# Patient Record
Sex: MALE | Race: WHITE | NOT HISPANIC OR LATINO | URBAN - METROPOLITAN AREA
[De-identification: names, ages, dates, MRNs, and addresses within clinical notes are randomized per-mention and may not be internally consistent; named-entity substitution may affect disease eponyms.]

---

## 2017-02-03 ENCOUNTER — ALLSCRIPTS OFFICE VISIT (OUTPATIENT)
Dept: OTHER | Facility: OTHER | Age: 72
End: 2017-02-03

## 2017-02-03 DIAGNOSIS — R26.89 OTHER ABNORMALITIES OF GAIT AND MOBILITY: ICD-10-CM

## 2017-02-03 DIAGNOSIS — M79.10 MYALGIA: ICD-10-CM

## 2017-02-03 DIAGNOSIS — M25.50 PAIN IN JOINT: ICD-10-CM

## 2017-02-03 DIAGNOSIS — M54.50 LOW BACK PAIN: ICD-10-CM

## 2017-02-09 ENCOUNTER — APPOINTMENT (OUTPATIENT)
Dept: PHYSICAL THERAPY | Facility: CLINIC | Age: 72
End: 2017-02-09
Payer: MEDICARE

## 2017-02-09 ENCOUNTER — GENERIC CONVERSION - ENCOUNTER (OUTPATIENT)
Dept: OTHER | Facility: OTHER | Age: 72
End: 2017-02-09

## 2017-03-06 ENCOUNTER — APPOINTMENT (OUTPATIENT)
Dept: PHYSICAL THERAPY | Facility: CLINIC | Age: 72
End: 2017-03-06
Payer: MEDICARE

## 2017-03-06 DIAGNOSIS — M54.50 LOW BACK PAIN: ICD-10-CM

## 2017-03-06 DIAGNOSIS — R26.89 OTHER ABNORMALITIES OF GAIT AND MOBILITY: ICD-10-CM

## 2017-03-06 PROCEDURE — 97163 PT EVAL HIGH COMPLEX 45 MIN: CPT

## 2017-03-06 PROCEDURE — G8990 OTHER PT/OT CURRENT STATUS: HCPCS

## 2017-03-06 PROCEDURE — G8991 OTHER PT/OT GOAL STATUS: HCPCS

## 2017-03-17 ENCOUNTER — APPOINTMENT (OUTPATIENT)
Dept: PHYSICAL THERAPY | Facility: CLINIC | Age: 72
End: 2017-03-17
Payer: MEDICARE

## 2017-03-24 ENCOUNTER — APPOINTMENT (OUTPATIENT)
Dept: PHYSICAL THERAPY | Facility: CLINIC | Age: 72
End: 2017-03-24
Payer: MEDICARE

## 2017-03-24 PROCEDURE — 97110 THERAPEUTIC EXERCISES: CPT

## 2017-03-24 PROCEDURE — 97140 MANUAL THERAPY 1/> REGIONS: CPT

## 2017-03-31 ENCOUNTER — APPOINTMENT (OUTPATIENT)
Dept: PHYSICAL THERAPY | Facility: CLINIC | Age: 72
End: 2017-03-31
Payer: MEDICARE

## 2017-03-31 PROCEDURE — 97530 THERAPEUTIC ACTIVITIES: CPT

## 2017-03-31 PROCEDURE — 97110 THERAPEUTIC EXERCISES: CPT

## 2017-04-07 ENCOUNTER — APPOINTMENT (OUTPATIENT)
Dept: PHYSICAL THERAPY | Facility: CLINIC | Age: 72
End: 2017-04-07
Payer: MEDICARE

## 2017-04-07 PROCEDURE — 97110 THERAPEUTIC EXERCISES: CPT

## 2017-04-07 PROCEDURE — 97530 THERAPEUTIC ACTIVITIES: CPT

## 2017-04-21 ENCOUNTER — APPOINTMENT (OUTPATIENT)
Dept: PHYSICAL THERAPY | Facility: CLINIC | Age: 72
End: 2017-04-21
Payer: MEDICARE

## 2017-04-28 ENCOUNTER — APPOINTMENT (OUTPATIENT)
Dept: PHYSICAL THERAPY | Facility: CLINIC | Age: 72
End: 2017-04-28
Payer: MEDICARE

## 2018-01-13 VITALS
RESPIRATION RATE: 18 BRPM | WEIGHT: 143.6 LBS | HEART RATE: 72 BPM | HEIGHT: 61 IN | DIASTOLIC BLOOD PRESSURE: 88 MMHG | BODY MASS INDEX: 27.11 KG/M2 | SYSTOLIC BLOOD PRESSURE: 122 MMHG

## 2018-06-11 RX ORDER — POTASSIUM CHLORIDE 750 MG/1
CAPSULE, EXTENDED RELEASE ORAL
COMMUNITY

## 2018-06-11 RX ORDER — MAGNESIUM CARB/ALUMINUM HYDROX 105-160MG
1 TABLET,CHEWABLE ORAL 2 TIMES DAILY
COMMUNITY

## 2018-06-11 RX ORDER — UBIDECARENONE 75 MG
1 CAPSULE ORAL 2 TIMES DAILY
COMMUNITY

## 2018-06-11 RX ORDER — MULTIVITAMIN WITH IRON
1 TABLET ORAL 2 TIMES DAILY
COMMUNITY

## 2018-06-11 RX ORDER — BIOTIN 1 MG
TABLET ORAL DAILY
COMMUNITY
Start: 2014-10-08

## 2018-06-12 ENCOUNTER — OFFICE VISIT (OUTPATIENT)
Dept: FAMILY MEDICINE CLINIC | Facility: CLINIC | Age: 73
End: 2018-06-12
Payer: MEDICARE

## 2018-06-12 VITALS
BODY MASS INDEX: 26.94 KG/M2 | WEIGHT: 142.6 LBS | RESPIRATION RATE: 14 BRPM | HEART RATE: 64 BPM | TEMPERATURE: 98 F | DIASTOLIC BLOOD PRESSURE: 72 MMHG | SYSTOLIC BLOOD PRESSURE: 118 MMHG

## 2018-06-12 DIAGNOSIS — N52.9 ERECTILE DYSFUNCTION, UNSPECIFIED ERECTILE DYSFUNCTION TYPE: ICD-10-CM

## 2018-06-12 DIAGNOSIS — H61.22 IMPACTED CERUMEN OF LEFT EAR: Primary | ICD-10-CM

## 2018-06-12 DIAGNOSIS — H60.502 ACUTE OTITIS EXTERNA OF LEFT EAR, UNSPECIFIED TYPE: ICD-10-CM

## 2018-06-12 PROCEDURE — 99213 OFFICE O/P EST LOW 20 MIN: CPT | Performed by: FAMILY MEDICINE

## 2018-06-12 RX ORDER — NEOMYCIN SULFATE, POLYMYXIN B SULFATE AND HYDROCORTISONE 10; 3.5; 1 MG/ML; MG/ML; [USP'U]/ML
4 SUSPENSION/ DROPS AURICULAR (OTIC) 3 TIMES DAILY
Qty: 10 ML | Refills: 0 | Status: SHIPPED | OUTPATIENT
Start: 2018-06-12

## 2018-06-12 RX ORDER — SILDENAFIL 100 MG/1
50 TABLET, FILM COATED ORAL DAILY PRN
Qty: 10 TABLET | Refills: 6 | Status: SHIPPED | OUTPATIENT
Start: 2018-06-12

## 2018-06-13 NOTE — PROGRESS NOTES
Assessment/Plan:  1  Impacted cerumen of left ear  - attempted to irrigate with warm water several times unsuccessfully, start Debrox ear drops and return in 1 week for repeat irrigation  - carbamide peroxide (DEBROX) 6 5 % otic solution; Administer 5 drops into the left ear 2 (two) times a day  Dispense: 15 mL; Refill: 0    2  Acute otitis externa of left ear, unspecified type  - neomycin-polymyxin-hydrocortisone (CORTISPORIN) 0 35%-10,000 units/mL-1% otic suspension; Administer 4 drops into the left ear 3 (three) times a day  Dispense: 10 mL; Refill: 0    3  Erectile dysfunction, unspecified erectile dysfunction type  - sildenafil (VIAGRA) 100 mg tablet; Take 0 5 tablets (50 mg total) by mouth daily as needed for erectile dysfunction  Dispense: 10 tablet; Refill: 6       Possible side effects of new medications were reviewed with the patient today  The treatment plan was reviewed with the patient  The patient understands and agrees with the treatment plan      Subjective:   Chief Complaint   Patient presents with    Earache     L      Patient ID: Brett Schneider is a 71 y o  male who presents today with c/o left ear pain and itching for the past few days, he noticed bloody d/c on his pillow yesterday am, no nasal congestion, no fever or chills, no cough  Patient has no other complaints, he is requesting refill for his Viagra that he has been using with good results            Past Medical History:   Diagnosis Date    Anemia     [id number]  RESOLVED    Gastric ulcer     Polio      Past Surgical History:   Procedure Laterality Date    FEMUR FRACTURE SURGERY Right     FOOT SURGERY      X2    LEG SURGERY      SHOULDER SURGERY Left      Family History   Problem Relation Age of Onset    Diabetes Mother     Thyroid disease Mother     Emphysema Father     Coronary artery disease Maternal Grandfather     Mental illness Neg Hx     Substance Abuse Neg Hx      Social History     Social History    Marital status: /Civil Union     Spouse name: N/A    Number of children: N/A    Years of education: N/A     Occupational History    Not on file  Social History Main Topics    Smoking status: Current Every Day Smoker     Packs/day: 1 00    Smokeless tobacco: Never Used    Alcohol use Yes      Comment: Rare use     Drug use: No    Sexual activity: Not on file     Other Topics Concern    Not on file     Social History Narrative    ADVANCE DIRECTIVE ON FILE    ALWAYS USES SEAT BELT    CAFFEINE USE   3-4 CUPS COFFEE A DAY ICED TEA DURING DAY       Current Outpatient Prescriptions:     Cholecalciferol (VITAMIN D3) 1000 units CAPS, Take by mouth daily, Disp: , Rfl:     cyanocobalamin (VITAMIN B-12) 100 mcg tablet, Take 1 tablet by mouth 2 (two) times a day, Disp: , Rfl:     Glucosamine-Chondroitin 750-600 MG TABS, Take 1 tablet by mouth 2 (two) times a day, Disp: , Rfl:     Omega-3 Fatty Acids (FISH OIL) 645 MG CAPS, Take 1 capsule by mouth 2 (two) times a day, Disp: , Rfl:     potassium chloride (MICRO-K) 10 MEQ CR capsule, Take by mouth, Disp: , Rfl:     pyridoxine (VITAMIN B6) 100 mg tablet, Take 1 tablet by mouth 2 (two) times a day, Disp: , Rfl:     carbamide peroxide (DEBROX) 6 5 % otic solution, Administer 5 drops into the left ear 2 (two) times a day, Disp: 15 mL, Rfl: 0    neomycin-polymyxin-hydrocortisone (CORTISPORIN) 0 35%-10,000 units/mL-1% otic suspension, Administer 4 drops into the left ear 3 (three) times a day, Disp: 10 mL, Rfl: 0    sildenafil (VIAGRA) 100 mg tablet, Take 0 5 tablets (50 mg total) by mouth daily as needed for erectile dysfunction, Disp: 10 tablet, Rfl: 6    Review of Systems   Constitutional: Negative for chills, fatigue, fever and unexpected weight change  HENT: Negative for congestion, postnasal drip, sinus pain and trouble swallowing  Respiratory: Negative for shortness of breath and wheezing      Cardiovascular: Negative for chest pain, palpitations and leg swelling  Genitourinary: Negative for difficulty urinating, dysuria, frequency, hematuria and urgency  Neurological: Negative for dizziness and syncope  Objective:    Vitals:    06/12/18 1430 06/12/18 1514   BP: 110/62 118/72   BP Location: Right arm Left arm   Patient Position: Sitting Sitting   Cuff Size: Standard Standard   Pulse: 64    Resp: 14    Temp: 98 °F (36 7 °C)    Weight: 64 7 kg (142 lb 9 6 oz)         Physical Exam   Constitutional: He appears well-developed and well-nourished  No distress  HENT:   Right Ear: Tympanic membrane and ear canal normal    Nose: No mucosal edema or rhinorrhea  Mouth/Throat: Oropharynx is clear and moist    Left ear canal erythematous with cerumen impaction, + tragal tenderness   Cardiovascular: Normal rate, regular rhythm and normal heart sounds  No murmur heard  Pulmonary/Chest: Effort normal and breath sounds normal  No respiratory distress  He has no wheezes  He has no rales

## 2018-06-19 ENCOUNTER — OFFICE VISIT (OUTPATIENT)
Dept: FAMILY MEDICINE CLINIC | Facility: CLINIC | Age: 73
End: 2018-06-19
Payer: MEDICARE

## 2018-06-19 VITALS
BODY MASS INDEX: 26.98 KG/M2 | DIASTOLIC BLOOD PRESSURE: 82 MMHG | WEIGHT: 142.8 LBS | RESPIRATION RATE: 14 BRPM | HEART RATE: 72 BPM | TEMPERATURE: 97.6 F | SYSTOLIC BLOOD PRESSURE: 120 MMHG

## 2018-06-19 DIAGNOSIS — R53.83 FATIGUE, UNSPECIFIED TYPE: ICD-10-CM

## 2018-06-19 DIAGNOSIS — H60.502 ACUTE OTITIS EXTERNA OF LEFT EAR, UNSPECIFIED TYPE: ICD-10-CM

## 2018-06-19 DIAGNOSIS — H93.12 TINNITUS OF LEFT EAR: ICD-10-CM

## 2018-06-19 DIAGNOSIS — E78.2 MIXED HYPERLIPIDEMIA: ICD-10-CM

## 2018-06-19 DIAGNOSIS — R49.9 CHANGE IN VOICE: ICD-10-CM

## 2018-06-19 DIAGNOSIS — Z12.5 SCREENING FOR PROSTATE CANCER: ICD-10-CM

## 2018-06-19 DIAGNOSIS — H61.22 IMPACTED CERUMEN OF LEFT EAR: Primary | ICD-10-CM

## 2018-06-19 PROCEDURE — 69210 REMOVE IMPACTED EAR WAX UNI: CPT | Performed by: FAMILY MEDICINE

## 2018-06-19 PROCEDURE — 99213 OFFICE O/P EST LOW 20 MIN: CPT | Performed by: FAMILY MEDICINE

## 2018-06-21 NOTE — PROGRESS NOTES
Assessment/Plan:  1  Impacted cerumen of left ear  - cerumen removed without difficulty with Pt reports improvement of his hearing    2  Acute otitis externa of left ear, unspecified type  - resolved    3  HM/ Mixed hyperlipidemia  - Lipid Panel with Direct LDL reflex; Future  - Comprehensive metabolic panel; Future  - CBC and differential; Future  - TSH, 3rd generation with Free T4 reflex; Future  - Urinalysis with reflex to microscopic    4  Screening for prostate cancer  - PSA, Total Screen; Future    5  Change in voice/ Tinnitus of left ear  - Ambulatory Referral to Otolaryngology; Future    F/u in 1 month for AWV with labs done prior  The treatment plan was reviewed with the patient  The patient understands and agrees with the treatment plan      Subjective:   Chief Complaint   Patient presents with    Earache     1 week recheck       Patient ID: Genetta Bumpers is a 71 y o  male who is here today for f/u her his left ear pain and cerumen impaction  He has been using his ear drops and no longer having ear pain, but still having plugged ear sensation  He is also c/o intermittent ringing in his left ear for years which has been getting progressively worse  He is also noticing a voice change that comes a goes for the oast several months, no sore throat, no trouble swallowing, no heartburn or abdominal pain             Past Medical History:   Diagnosis Date    Anemia     [id number]  RESOLVED    Gastric ulcer     Polio      Past Surgical History:   Procedure Laterality Date    FEMUR FRACTURE SURGERY Right     FOOT SURGERY      X2    LEG SURGERY      SHOULDER SURGERY Left      Family History   Problem Relation Age of Onset    Diabetes Mother     Thyroid disease Mother     Emphysema Father     Coronary artery disease Maternal Grandfather     Mental illness Neg Hx     Substance Abuse Neg Hx      Social History     Social History    Marital status: /Civil Union     Spouse name: N/A    Number of children: N/A    Years of education: N/A     Occupational History    Not on file  Social History Main Topics    Smoking status: Current Every Day Smoker     Packs/day: 1 00    Smokeless tobacco: Never Used    Alcohol use Yes      Comment: Rare use     Drug use: No    Sexual activity: Not on file     Other Topics Concern    Not on file     Social History Narrative    ADVANCE DIRECTIVE ON FILE    ALWAYS USES SEAT BELT    CAFFEINE USE   3-4 CUPS COFFEE A DAY ICED TEA DURING DAY       Current Outpatient Prescriptions:     carbamide peroxide (DEBROX) 6 5 % otic solution, Administer 5 drops into the left ear 2 (two) times a day, Disp: 15 mL, Rfl: 0    Cholecalciferol (VITAMIN D3) 1000 units CAPS, Take by mouth daily, Disp: , Rfl:     cyanocobalamin (VITAMIN B-12) 100 mcg tablet, Take 1 tablet by mouth 2 (two) times a day, Disp: , Rfl:     Glucosamine-Chondroitin 750-600 MG TABS, Take 1 tablet by mouth 2 (two) times a day, Disp: , Rfl:     neomycin-polymyxin-hydrocortisone (CORTISPORIN) 0 35%-10,000 units/mL-1% otic suspension, Administer 4 drops into the left ear 3 (three) times a day, Disp: 10 mL, Rfl: 0    Omega-3 Fatty Acids (FISH OIL) 645 MG CAPS, Take 1 capsule by mouth 2 (two) times a day, Disp: , Rfl:     potassium chloride (MICRO-K) 10 MEQ CR capsule, Take by mouth, Disp: , Rfl:     pyridoxine (VITAMIN B6) 100 mg tablet, Take 1 tablet by mouth 2 (two) times a day, Disp: , Rfl:     sildenafil (VIAGRA) 100 mg tablet, Take 0 5 tablets (50 mg total) by mouth daily as needed for erectile dysfunction, Disp: 10 tablet, Rfl: 6    Review of Systems   Constitutional: Negative for chills, fatigue, fever and unexpected weight change  HENT: Positive for hearing loss, tinnitus and voice change  Negative for congestion, ear discharge, ear pain, postnasal drip, sinus pain, sore throat and trouble swallowing  Respiratory: Negative for shortness of breath and wheezing      Cardiovascular: Negative for chest pain, palpitations and leg swelling  Gastrointestinal: Negative for abdominal pain, blood in stool, diarrhea, nausea and vomiting  Genitourinary: Negative for difficulty urinating, dysuria, frequency, hematuria and urgency  Neurological: Negative for dizziness and syncope  Objective:    Vitals:    06/19/18 1451   BP: 120/82   BP Location: Left arm   Patient Position: Sitting   Cuff Size: Standard   Pulse: 72   Resp: 14   Temp: 97 6 °F (36 4 °C)   TempSrc: Oral   Weight: 64 8 kg (142 lb 12 8 oz)        Physical Exam   Constitutional: He appears well-developed and well-nourished  No distress  HENT:   Right Ear: Tympanic membrane and ear canal normal    Nose: No mucosal edema or rhinorrhea  Mouth/Throat: Oropharynx is clear and moist    Left ear canal with cerumen impaction  Post irrigation: TM and EAC clear, no tragal tenderness   Neck: Neck supple  No thyromegaly present  Cardiovascular: Normal rate, regular rhythm and normal heart sounds  No murmur heard  Pulmonary/Chest: Effort normal and breath sounds normal  No respiratory distress  He has no wheezes  He has no rales  Lymphadenopathy:     He has no cervical adenopathy  Ear cerumen removal  Date/Time: 6/19/2018 1:44 PM  Performed by: Loretta Landeros  Authorized by: Loretta Landeros     Consent:     Consent obtained:  Verbal    Risks discussed:  Bleeding, infection, dizziness, TM perforation and pain    Alternatives discussed:  Referral  Procedure details:     Location:  L ear    Procedure type: curette      Procedure type comment:  And irrigation  Post-procedure details:     Hearing quality:  Improved    Patient tolerance of procedure:   Tolerated well, no immediate complications

## 2018-10-17 ENCOUNTER — IMMUNIZATION (OUTPATIENT)
Dept: FAMILY MEDICINE CLINIC | Facility: CLINIC | Age: 73
End: 2018-10-17
Payer: MEDICARE

## 2018-10-17 DIAGNOSIS — Z23 ENCOUNTER FOR IMMUNIZATION: ICD-10-CM

## 2018-10-17 PROCEDURE — 90686 IIV4 VACC NO PRSV 0.5 ML IM: CPT

## 2018-10-17 PROCEDURE — G0008 ADMIN INFLUENZA VIRUS VAC: HCPCS

## 2023-03-27 ENCOUNTER — OFFICE VISIT (OUTPATIENT)
Dept: FAMILY MEDICINE CLINIC | Facility: CLINIC | Age: 78
End: 2023-03-27

## 2023-03-27 VITALS
WEIGHT: 141.5 LBS | RESPIRATION RATE: 16 BRPM | HEIGHT: 64 IN | DIASTOLIC BLOOD PRESSURE: 78 MMHG | HEART RATE: 85 BPM | BODY MASS INDEX: 24.16 KG/M2 | OXYGEN SATURATION: 96 % | SYSTOLIC BLOOD PRESSURE: 130 MMHG

## 2023-03-27 DIAGNOSIS — E55.9 VITAMIN D DEFICIENCY: ICD-10-CM

## 2023-03-27 DIAGNOSIS — I77.89 OTHER SPECIFIED DISORDERS OF ARTERIES AND ARTERIOLES (HCC): ICD-10-CM

## 2023-03-27 DIAGNOSIS — Z76.89 ENCOUNTER TO ESTABLISH CARE: Primary | ICD-10-CM

## 2023-03-27 DIAGNOSIS — R31.29 MICROSCOPIC HEMATURIA: ICD-10-CM

## 2023-03-27 DIAGNOSIS — Z13.29 SCREENING FOR THYROID DISORDER: ICD-10-CM

## 2023-03-27 DIAGNOSIS — Z87.891 FORMER SMOKER: ICD-10-CM

## 2023-03-27 DIAGNOSIS — Z13.6 SCREENING FOR CARDIOVASCULAR CONDITION: ICD-10-CM

## 2023-03-27 DIAGNOSIS — I65.29 STENOSIS OF CAROTID ARTERY, UNSPECIFIED LATERALITY: ICD-10-CM

## 2023-03-27 DIAGNOSIS — Z12.11 COLON CANCER SCREENING: ICD-10-CM

## 2023-03-27 DIAGNOSIS — Z23 NEED FOR PNEUMOCOCCAL VACCINATION: ICD-10-CM

## 2023-03-27 DIAGNOSIS — Z13.228 SCREENING FOR METABOLIC DISORDER: ICD-10-CM

## 2023-03-27 DIAGNOSIS — E78.2 MIXED HYPERLIPIDEMIA: ICD-10-CM

## 2023-03-27 DIAGNOSIS — Z13.89 SCREENING FOR BLOOD OR PROTEIN IN URINE: ICD-10-CM

## 2023-03-27 DIAGNOSIS — Z12.5 SCREENING FOR PROSTATE CANCER: ICD-10-CM

## 2023-03-27 PROBLEM — A80.9 POLIO: Status: ACTIVE | Noted: 2018-08-07

## 2023-03-27 PROBLEM — M41.9 SCOLIOSIS: Status: ACTIVE | Noted: 2018-08-07

## 2023-03-27 NOTE — PROGRESS NOTES
"Assessment/Plan:     Diagnoses and all orders for this visit:    Encounter to establish care    New/old patient here today to reestablish care  Mixed hyperlipidemia  -     Lipid panel; Future    Screening for cardiovascular condition  -     CBC and differential; Future    Screening for metabolic disorder  -     Comprehensive metabolic panel; Future    Stenosis of carotid artery, unspecified laterality  -     VAS carotid complete study; Future    Hx of stenosis in patient's chart  Pt is unsure how long ago this was diagnosed  Carotid u/s ordered  Screening for thyroid disorder  -     TSH, 3rd generation with Free T4 reflex; Future    Screening for prostate cancer  -     PSA, Total Screen; Future    Vitamin D deficiency  -     Vitamin D 25 hydroxy; Future    Screening for blood or protein in urine  -     UA (URINE) with reflex to Scope    Microscopic hematuria  -     CBC and differential; Future    Other specified disorders of arteries and arterioles (HCC)  -     VAS carotid complete study; Future    Colon cancer screening  -     Cologuard    Need for pneumococcal vaccination  -     Pneumococcal Conjugate Vaccine 20-valent (Pcv20)    Former smoker  -     CT lung screening program; Future            Pt to have fasting labs and schedule appt  For AWV  Subjective:      Patient ID: Tracy Honeycutt is a 69 y o  male  Pt here to re-establish care today  Pt admits that he has not been taking care of himself like he should  He notes his wife passed away last year  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 scoliosis  The following portions of the patient's history were reviewed and updated as appropriate: allergies, current medications, past family history, past medical history, past social history, past surgical history and problem list     Review of Systems    As noted per HPI      Objective:      /78   Pulse 85   Resp 16   Ht 5' 4\" (1 626 m)   Wt 64 2 kg (141 lb 8 oz)   SpO2 96%   BMI " 24 29 kg/m²          Physical Exam  Vitals reviewed  Constitutional:       Appearance: Normal appearance  Cardiovascular:      Rate and Rhythm: Normal rate and regular rhythm  Pulses: Normal pulses  Heart sounds: Normal heart sounds  Pulmonary:      Effort: Pulmonary effort is normal       Breath sounds: Normal breath sounds  Abdominal:      General: Bowel sounds are normal       Palpations: Abdomen is soft  Musculoskeletal:      Right lower leg: Edema (trace) present  Left lower leg: Edema (trace) present  Skin:     General: Skin is warm  Capillary Refill: Capillary refill takes less than 2 seconds  Neurological:      General: No focal deficit present  Mental Status: He is alert and oriented to person, place, and time     Psychiatric:         Mood and Affect: Mood normal          Behavior: Behavior normal

## 2023-04-20 ENCOUNTER — APPOINTMENT (OUTPATIENT)
Dept: LAB | Facility: CLINIC | Age: 78
End: 2023-04-20

## 2023-04-20 DIAGNOSIS — E78.2 MIXED HYPERLIPIDEMIA: ICD-10-CM

## 2023-04-20 DIAGNOSIS — E55.9 VITAMIN D DEFICIENCY: ICD-10-CM

## 2023-04-20 DIAGNOSIS — R31.29 MICROSCOPIC HEMATURIA: ICD-10-CM

## 2023-04-20 DIAGNOSIS — Z12.5 SCREENING FOR PROSTATE CANCER: ICD-10-CM

## 2023-04-20 DIAGNOSIS — Z13.6 SCREENING FOR CARDIOVASCULAR CONDITION: ICD-10-CM

## 2023-04-20 DIAGNOSIS — Z13.29 SCREENING FOR THYROID DISORDER: ICD-10-CM

## 2023-04-20 DIAGNOSIS — Z13.228 SCREENING FOR METABOLIC DISORDER: ICD-10-CM

## 2023-04-20 LAB
25(OH)D3 SERPL-MCNC: 31.1 NG/ML (ref 30–100)
ALBUMIN SERPL BCP-MCNC: 3.7 G/DL (ref 3.5–5)
ALP SERPL-CCNC: 96 U/L (ref 46–116)
ALT SERPL W P-5'-P-CCNC: 19 U/L (ref 12–78)
ANION GAP SERPL CALCULATED.3IONS-SCNC: 1 MMOL/L (ref 4–13)
AST SERPL W P-5'-P-CCNC: 20 U/L (ref 5–45)
BASOPHILS # BLD AUTO: 0.07 THOUSANDS/ΜL (ref 0–0.1)
BASOPHILS NFR BLD AUTO: 1 % (ref 0–1)
BILIRUB SERPL-MCNC: 0.68 MG/DL (ref 0.2–1)
BUN SERPL-MCNC: 17 MG/DL (ref 5–25)
CALCIUM SERPL-MCNC: 9.3 MG/DL (ref 8.3–10.1)
CHLORIDE SERPL-SCNC: 110 MMOL/L (ref 96–108)
CHOLEST SERPL-MCNC: 170 MG/DL
CO2 SERPL-SCNC: 29 MMOL/L (ref 21–32)
CREAT SERPL-MCNC: 0.39 MG/DL (ref 0.6–1.3)
EOSINOPHIL # BLD AUTO: 0.19 THOUSAND/ΜL (ref 0–0.61)
EOSINOPHIL NFR BLD AUTO: 3 % (ref 0–6)
ERYTHROCYTE [DISTWIDTH] IN BLOOD BY AUTOMATED COUNT: 14.4 % (ref 11.6–15.1)
GFR SERPL CREATININE-BSD FRML MDRD: 116 ML/MIN/1.73SQ M
GLUCOSE P FAST SERPL-MCNC: 90 MG/DL (ref 65–99)
HCT VFR BLD AUTO: 46.1 % (ref 36.5–49.3)
HDLC SERPL-MCNC: 75 MG/DL
HGB BLD-MCNC: 14.7 G/DL (ref 12–17)
IMM GRANULOCYTES # BLD AUTO: 0.02 THOUSAND/UL (ref 0–0.2)
IMM GRANULOCYTES NFR BLD AUTO: 0 % (ref 0–2)
LDLC SERPL CALC-MCNC: 80 MG/DL (ref 0–100)
LYMPHOCYTES # BLD AUTO: 1.4 THOUSANDS/ΜL (ref 0.6–4.47)
LYMPHOCYTES NFR BLD AUTO: 24 % (ref 14–44)
MCH RBC QN AUTO: 32.4 PG (ref 26.8–34.3)
MCHC RBC AUTO-ENTMCNC: 31.9 G/DL (ref 31.4–37.4)
MCV RBC AUTO: 102 FL (ref 82–98)
MONOCYTES # BLD AUTO: 0.56 THOUSAND/ΜL (ref 0.17–1.22)
MONOCYTES NFR BLD AUTO: 10 % (ref 4–12)
NEUTROPHILS # BLD AUTO: 3.54 THOUSANDS/ΜL (ref 1.85–7.62)
NEUTS SEG NFR BLD AUTO: 62 % (ref 43–75)
NONHDLC SERPL-MCNC: 95 MG/DL
NRBC BLD AUTO-RTO: 0 /100 WBCS
PLATELET # BLD AUTO: 280 THOUSANDS/UL (ref 149–390)
PMV BLD AUTO: 10.9 FL (ref 8.9–12.7)
POTASSIUM SERPL-SCNC: 5 MMOL/L (ref 3.5–5.3)
PROT SERPL-MCNC: 7.2 G/DL (ref 6.4–8.4)
PSA SERPL-MCNC: 0.4 NG/ML (ref 0–4)
RBC # BLD AUTO: 4.54 MILLION/UL (ref 3.88–5.62)
SODIUM SERPL-SCNC: 140 MMOL/L (ref 135–147)
TRIGL SERPL-MCNC: 74 MG/DL
TSH SERPL DL<=0.05 MIU/L-ACNC: 1.72 UIU/ML (ref 0.45–4.5)
WBC # BLD AUTO: 5.78 THOUSAND/UL (ref 4.31–10.16)

## 2023-04-27 ENCOUNTER — HOSPITAL ENCOUNTER (OUTPATIENT)
Dept: RADIOLOGY | Facility: HOSPITAL | Age: 78
Discharge: HOME | End: 2023-04-27

## 2023-04-27 DIAGNOSIS — Z87.891 FORMER SMOKER: ICD-10-CM

## 2023-05-01 ENCOUNTER — HOSPITAL ENCOUNTER (OUTPATIENT)
Dept: NON INVASIVE DIAGNOSTICS | Facility: CLINIC | Age: 78
Discharge: HOME | End: 2023-05-01

## 2023-05-01 ENCOUNTER — TELEPHONE (OUTPATIENT)
Dept: FAMILY MEDICINE CLINIC | Facility: CLINIC | Age: 78
End: 2023-05-01

## 2023-05-01 DIAGNOSIS — I77.89 OTHER SPECIFIED DISORDERS OF ARTERIES AND ARTERIOLES (HCC): ICD-10-CM

## 2023-05-01 DIAGNOSIS — Z87.891 FORMER SMOKER: Primary | ICD-10-CM

## 2023-05-01 DIAGNOSIS — I65.29 STENOSIS OF CAROTID ARTERY, UNSPECIFIED LATERALITY: ICD-10-CM

## 2023-05-01 NOTE — TELEPHONE ENCOUNTER
----- Message from Thor Cid sent at 5/1/2023  1:43 PM EDT -----  Please let patient know that his lung CT screening did not show any lung nodules  It did note some gallstones, although no infection or obstructing stones  The radiologist recommends we repeat Lung CT scan in 1 year which I have ordered

## 2023-05-01 NOTE — TELEPHONE ENCOUNTER
I called and s/w the pt he is aware of his results  Pt has VAS CEREBROVASCULAR STUDY done today  His colonoscopy is schedule for July 28th (that was the soonest)  Pt is wondering if that is ok to wait until July

## 2023-05-02 ENCOUNTER — TELEPHONE (OUTPATIENT)
Dept: FAMILY MEDICINE CLINIC | Facility: CLINIC | Age: 78
End: 2023-05-02

## 2023-05-02 NOTE — TELEPHONE ENCOUNTER
I called and s/w the pt, he is aware of the results  I called the GI office and they said to have him call their office and they will go over a few questions to see if he qualifies to just be scheduled for the colonoscopy  His 7/28 appt is just for a consult

## 2023-05-02 NOTE — TELEPHONE ENCOUNTER
----- Message from Emily Ho, [address] sent at 5/2/2023  7:39 AM EDT -----  Jeffery Coyle please call patient and let him know that there are no significant changes to his carotid arteries since his last study  No blockages noted  I am not sure why he cannot be seen until late July  Can you call GI and see if they can get him in sooner  His cologuard testing was positive  I thought that they expedited these patients?

## 2023-05-03 ENCOUNTER — OFFICE VISIT (OUTPATIENT)
Dept: GASTROENTEROLOGY | Facility: CLINIC | Age: 78
End: 2023-05-03

## 2023-05-03 VITALS
WEIGHT: 142.2 LBS | DIASTOLIC BLOOD PRESSURE: 78 MMHG | BODY MASS INDEX: 24.28 KG/M2 | TEMPERATURE: 98.4 F | HEIGHT: 64 IN | SYSTOLIC BLOOD PRESSURE: 126 MMHG

## 2023-05-03 DIAGNOSIS — Z12.11 COLON CANCER SCREENING: Primary | ICD-10-CM

## 2023-05-03 DIAGNOSIS — R19.5 POSITIVE COLORECTAL CANCER SCREENING USING COLOGUARD TEST: ICD-10-CM

## 2023-05-03 NOTE — H&P (VIEW-ONLY)
[facility] - Outpatient Consultation  Yaquelin Linder 69 y o  male MRN: [id number]  Encounter: [id number]          ASSESSMENT AND PLAN:      1  Colon cancer screening  2  Positive colorectal cancer screening using Cologuard test  I explained his positive Cologuard test could indicate presence of precancerous polyps or colon cancer  His last colonoscopy was over 20 years ago, so I recommend repeating colonoscopy at this time  I discussed risks and benefits of the procedure  Risks include but are not limited to infection, bleeding, perforation, missed lesions  He expressed understanding and all questions were answered  He request to be scheduled at the [facility]  Gave instructions for GoLytely prep  He does not take blood thinners  - Ambulatory Referral to Gastroenterology  - Colonoscopy; Future  - polyethylene glycol (GOLYTELY) 4000 mL solution; Take as directed by the office for colonoscopy  Dispense: 4000 mL; Refill: 0    Follow-up as needed  ______________________________________________________________________    HPI: 80-year-old male with history of polio and ambulatory dysfunction referred by PCP for positive Cologuard test     He had colonoscopy 20 to 25 years ago which was apparently normal  He denies any family history of colon cancer  He denies rectal bleeding, abnormal weight loss, diarrhea, constipation, nausea, vomiting, reflux, dysphagia  He lost his wife in the fall and he has not been eating as well as usual, a lot of TV dinners and not so healthy foods  He has never had abdominal surgery  He has history of smoking on and off for 50 years, but he quit about 6 months ago  REVIEW OF SYSTEMS:    CONSTITUTIONAL: Denies any fever, chills, rigors, and weight loss  HEENT: No earache or tinnitus  Denies hearing loss or visual disturbances  CARDIOVASCULAR: No chest pain or palpitations     RESPIRATORY: Denies any cough, hemoptysis, shortness of breath or "dyspnea on exertion  GASTROINTESTINAL: As noted in the History of Present Illness  GENITOURINARY: No problems with urination  Denies any hematuria or dysuria  NEUROLOGIC: No dizziness or vertigo, denies headaches  MUSCULOSKELETAL: + Hip pain and ambulatory dysfunction  SKIN: Denies skin rashes or itching  ENDOCRINE: Denies excessive thirst  Denies intolerance to heat or cold  PSYCHOSOCIAL: Denies depression or anxiety  Denies any recent memory loss  Historical Information   Past Medical History:   Diagnosis Date   • Anemia     [id number]  RESOLVED   • Gastric ulcer    • Polio    • Scoliosis      Past Surgical History:   Procedure Laterality Date   • COLONOSCOPY     • FEMUR FRACTURE SURGERY Right    • FOOT SURGERY      X2   • LEG SURGERY     • SHOULDER SURGERY Left      Social History   Social History     Substance and Sexual Activity   Alcohol Use Yes    Comment: Rare use      Social History     Substance and Sexual Activity   Drug Use No     Social History     Tobacco Use   Smoking Status Every Day   • Packs/day: 1 00   • Types: Cigarettes   Smokeless Tobacco Never     Family History   Problem Relation Age of Onset   • Diabetes Mother    • Thyroid disease Mother    • Emphysema Father    • Coronary artery disease Maternal Grandfather    • Mental illness Neg Hx    • Substance Abuse Neg Hx        Meds/Allergies       Current Outpatient Medications:   •  Cholecalciferol (VITAMIN D3) 1000 units CAPS  •  cyanocobalamin (VITAMIN B-12) 100 mcg tablet  •  Glucosamine-Chondroitin 750-600 MG TABS  •  polyethylene glycol (GOLYTELY) 4000 mL solution  •  potassium chloride (MICRO-K) 10 MEQ CR capsule  •  pyridoxine (VITAMIN B6) 100 mg tablet    No Known Allergies        Objective     Blood pressure 126/78, temperature 98 4 °F (36 9 °C), temperature source Tympanic, height 5' 4\" (1 626 m), weight 64 5 kg (142 lb 3 2 oz)  Body mass index is 24 41 kg/m²          PHYSICAL EXAM:      General Appearance:   Alert, " cooperative, no distress   HEENT:   Normocephalic, atraumatic, anicteric      Neck:  Supple, symmetrical, trachea midline   Lungs:   Clear to auscultation bilaterally   Heart[de-identified]   Regular rate and rhythm   Abdomen:   Soft, non-tender, non-distended; normal bowel sounds   Genitalia:   Deferred    Rectal:   Deferred    Extremities:  No cyanosis, clubbing or edema    Pulses:  2+ and symmetric    Skin:  No jaundice, rashes, or lesions    Lymph nodes:  No palpable cervical lymphadenopathy        Lab Results:   No visits with results within 1 Day(s) from this visit     Latest known visit with results is:   Appointment on 04/20/2023   Component Date Value   • WBC 04/20/2023 5 78    • RBC 04/20/2023 4 54    • Hemoglobin 04/20/2023 14 7    • Hematocrit 04/20/2023 46 1    • MCV 04/20/2023 102 (H)    • MCH 04/20/2023 32 4    • MCHC 04/20/2023 31 9    • RDW 04/20/2023 14 4    • MPV 04/20/2023 10 9    • Platelets 79/68/0209 280    • nRBC 04/20/2023 0    • Neutrophils Relative 04/20/2023 62    • Immat GRANS % 04/20/2023 0    • Lymphocytes Relative 04/20/2023 24    • Monocytes Relative 04/20/2023 10    • Eosinophils Relative 04/20/2023 3    • Basophils Relative 04/20/2023 1    • Neutrophils Absolute 04/20/2023 3 54    • Immature Grans Absolute 04/20/2023 0 02    • Lymphocytes Absolute 04/20/2023 1 40    • Monocytes Absolute 04/20/2023 0 56    • Eosinophils Absolute 04/20/2023 0 19    • Basophils Absolute 04/20/2023 0 07    • Sodium 04/20/2023 140    • Potassium 04/20/2023 5 0    • Chloride 04/20/2023 110 (H)    • CO2 04/20/2023 29    • ANION GAP 04/20/2023 1 (L)    • BUN 04/20/2023 17    • Creatinine 04/20/2023 0 39 (L)    • Glucose, Fasting 04/20/2023 90    • Calcium 04/20/2023 9 3    • AST 04/20/2023 20    • ALT 04/20/2023 19    • Alkaline Phosphatase 04/20/2023 96    • Total Protein 04/20/2023 7 2    • Albumin 04/20/2023 3 7    • Total Bilirubin 04/20/2023 0 68    • eGFR 04/20/2023 116    • Cholesterol 04/20/2023 170    • Triglycerides 04/20/2023 74    • HDL, Direct 04/20/2023 75    • LDL Calculated 04/20/2023 80    • Non-HDL-Chol (CHOL-HDL) 04/20/2023 95    • TSH 3RD GENERATON 04/20/2023 1 720    • PSA 04/20/2023 0 4    • Vit D, 25-Hydroxy 04/20/2023 31 1          Radiology Results:   CT lung screening program    Result Date: 5/1/2023  Narrative: CT CHEST LUNG CANCER SCREENING WITHOUT IV CONTRAST INDICATION:   Z87 891: Personal history of nicotine dependence  COMPARISON: None  TECHNIQUE:  Unenhanced CT examination of the chest was performed utilizing a low dose protocol  Multiplanar 2D reformatted images were created from the source data  Radiation dose length product (DLP) for this visit:  130 57 mGy-cm   This examination, like all CT scans performed in the [facility], was performed utilizing techniques to minimize radiation dose exposure, including the use of iterative  reconstruction and automated exposure control  FINDINGS: LUNGS: Patent central airways  Moderate upper lobe predominant centrilobular emphysema  No focal consolidation  Scattered small calcified granulomas  Few scattered right lung nodules measuring 1-2 mm in size (for example, series 4 images 86, 99, 114, 157, 162)  No mass or suspicious nodule  PLEURA:  Unremarkable  HEART/GREAT VESSELS: Normal cardiac size  Mild coronary artery calcifications  No thoracic aortic aneurysm  MEDIASTINUM AND IFTIKHAR:  Unremarkable  CHEST WALL AND LOWER NECK:  Unremarkable  VISUALIZED STRUCTURES IN THE UPPER ABDOMEN: Calcified gallstones  Partially imaged right renal cortical cyst  OSSEOUS STRUCTURES: Degenerative changes of the spine with dextroconvex scoliosis  Impression: 1  No suspicious pulmonary nodule  2  Moderate emphysema  3  Cholelithiasis  Lung-RADS2, benign appearance or behavior  Continue annual screening with LDCT in 12 months   Workstation performed: [id number]     VAS carotid complete study    Result Date: 5/1/2023  Narrative:  [facility] REPORT CLINICAL: Indications:  Patient presents with a cervical bruit and multiple cardiovascular risk factors  Patient is asymptomatic from a cerebral vascular standpoint  Operative History: Patient denies any previous cardiovascular surgery Risk Factors: Hyperlipidemia and smoking (current) 1-2 ppd  Clinical Right Pressure:  142/68 mm Hg, Left Pressure:  144/70 mm Hg  FINDINGS:  Right        Impression  PSV  EDV (cm/s)  Direction of Flow  Ratio  Dist  ICA                 62          18                      0 73  Mid  ICA                  41          10                      0 48  Prox  ICA    1 - 49%      69          16                      0 80  Dist CCA                  74          13                            Mid CCA                   86          13                      0 96  Prox CCA                  89          11                            Ext Carotid               97           6                      1 14  Prox Vert                 53          16  Antegrade                 Subclavian               129           0                             Left         Impression  PSV  EDV (cm/s)  Direction of Flow  Ratio  Dist  ICA                 49          19                      0 71  Mid  ICA                  47          14                      0 68  Prox  ICA    1 - 49%      45          12                      0 65  Dist CCA                  65          13                            Mid CCA                   69          14                      1 17  Prox CCA                  59          12                            Ext Carotid              102          14                      1 48  Prox Vert                 63          15  Antegrade                 Subclavian               152           0                               CONCLUSION:  Impression RIGHT: There is <50% stenosis in the internal carotid artery  Plaque is heterogenous and irregular  The extracranial arteries are tortuous   Vertebral artery flow is antegrade  There is no significant subclavian artery disease  LEFT: There is <50% stenosis in the internal carotid artery  Plaque is homogenous and smooth  The extracranial arteries are tortuous  Vertebral artery flow is antegrade  There is no significant subclavian artery disease  Compared to previous study on 6/12/2013, there is no significant change    SIGNATURE: Electronically Signed by: Paola Ewing on 2023-05-01 05:49:19 PM

## 2023-05-03 NOTE — PROGRESS NOTES
[facility] - Outpatient Consultation  Jasvir Fernandes 69 y o  male MRN: [id number]  Encounter: [id number]          ASSESSMENT AND PLAN:      1  Colon cancer screening  2  Positive colorectal cancer screening using Cologuard test  I explained his positive Cologuard test could indicate presence of precancerous polyps or colon cancer  His last colonoscopy was over 20 years ago, so I recommend repeating colonoscopy at this time  I discussed risks and benefits of the procedure  Risks include but are not limited to infection, bleeding, perforation, missed lesions  He expressed understanding and all questions were answered  He request to be scheduled at the [facility]  Gave instructions for GoLytely prep  He does not take blood thinners  - Ambulatory Referral to Gastroenterology  - Colonoscopy; Future  - polyethylene glycol (GOLYTELY) 4000 mL solution; Take as directed by the office for colonoscopy  Dispense: 4000 mL; Refill: 0    Follow-up as needed  ______________________________________________________________________    HPI: 60-year-old male with history of polio and ambulatory dysfunction referred by PCP for positive Cologuard test     He had colonoscopy 20 to 25 years ago which was apparently normal  He denies any family history of colon cancer  He denies rectal bleeding, abnormal weight loss, diarrhea, constipation, nausea, vomiting, reflux, dysphagia  He lost his wife in the fall and he has not been eating as well as usual, a lot of TV dinners and not so healthy foods  He has never had abdominal surgery  He has history of smoking on and off for 50 years, but he quit about 6 months ago  REVIEW OF SYSTEMS:    CONSTITUTIONAL: Denies any fever, chills, rigors, and weight loss  HEENT: No earache or tinnitus  Denies hearing loss or visual disturbances  CARDIOVASCULAR: No chest pain or palpitations     RESPIRATORY: Denies any cough, hemoptysis, shortness of breath or "dyspnea on exertion  GASTROINTESTINAL: As noted in the History of Present Illness  GENITOURINARY: No problems with urination  Denies any hematuria or dysuria  NEUROLOGIC: No dizziness or vertigo, denies headaches  MUSCULOSKELETAL: + Hip pain and ambulatory dysfunction  SKIN: Denies skin rashes or itching  ENDOCRINE: Denies excessive thirst  Denies intolerance to heat or cold  PSYCHOSOCIAL: Denies depression or anxiety  Denies any recent memory loss  Historical Information   Past Medical History:   Diagnosis Date    Anemia     [id number]  RESOLVED    Gastric ulcer     Polio     Scoliosis      Past Surgical History:   Procedure Laterality Date    COLONOSCOPY      FEMUR FRACTURE SURGERY Right     FOOT SURGERY      X2    LEG SURGERY      SHOULDER SURGERY Left      Social History   Social History     Substance and Sexual Activity   Alcohol Use Yes    Comment: Rare use      Social History     Substance and Sexual Activity   Drug Use No     Social History     Tobacco Use   Smoking Status Every Day    Packs/day: 1 00    Types: Cigarettes   Smokeless Tobacco Never     Family History   Problem Relation Age of Onset    Diabetes Mother     Thyroid disease Mother     Emphysema Father     Coronary artery disease Maternal Grandfather     Mental illness Neg Hx     Substance Abuse Neg Hx        Meds/Allergies       Current Outpatient Medications:     Cholecalciferol (VITAMIN D3) 1000 units CAPS    cyanocobalamin (VITAMIN B-12) 100 mcg tablet    Glucosamine-Chondroitin 750-600 MG TABS    polyethylene glycol (GOLYTELY) 4000 mL solution    potassium chloride (MICRO-K) 10 MEQ CR capsule    pyridoxine (VITAMIN B6) 100 mg tablet    No Known Allergies        Objective     Blood pressure 126/78, temperature 98 4 °F (36 9 °C), temperature source Tympanic, height 5' 4\" (1 626 m), weight 64 5 kg (142 lb 3 2 oz)  Body mass index is 24 41 kg/m²          PHYSICAL EXAM:      General Appearance:   Alert, " cooperative, no distress   HEENT:   Normocephalic, atraumatic, anicteric      Neck:  Supple, symmetrical, trachea midline   Lungs:   Clear to auscultation bilaterally   Heart[de-identified]   Regular rate and rhythm   Abdomen:   Soft, non-tender, non-distended; normal bowel sounds   Genitalia:   Deferred    Rectal:   Deferred    Extremities:  No cyanosis, clubbing or edema    Pulses:  2+ and symmetric    Skin:  No jaundice, rashes, or lesions    Lymph nodes:  No palpable cervical lymphadenopathy        Lab Results:   No visits with results within 1 Day(s) from this visit     Latest known visit with results is:   Appointment on 04/20/2023   Component Date Value    WBC 04/20/2023 5 78     RBC 04/20/2023 4 54     Hemoglobin 04/20/2023 14 7     Hematocrit 04/20/2023 46 1     MCV 04/20/2023 102 (H)     MCH 04/20/2023 32 4     MCHC 04/20/2023 31 9     RDW 04/20/2023 14 4     MPV 04/20/2023 10 9     Platelets 82/64/1721 280     nRBC 04/20/2023 0     Neutrophils Relative 04/20/2023 62     Immat GRANS % 04/20/2023 0     Lymphocytes Relative 04/20/2023 24     Monocytes Relative 04/20/2023 10     Eosinophils Relative 04/20/2023 3     Basophils Relative 04/20/2023 1     Neutrophils Absolute 04/20/2023 3 54     Immature Grans Absolute 04/20/2023 0 02     Lymphocytes Absolute 04/20/2023 1 40     Monocytes Absolute 04/20/2023 0 56     Eosinophils Absolute 04/20/2023 0 19     Basophils Absolute 04/20/2023 0 07     Sodium 04/20/2023 140     Potassium 04/20/2023 5 0     Chloride 04/20/2023 110 (H)     CO2 04/20/2023 29     ANION GAP 04/20/2023 1 (L)     BUN 04/20/2023 17     Creatinine 04/20/2023 0 39 (L)     Glucose, Fasting 04/20/2023 90     Calcium 04/20/2023 9 3     AST 04/20/2023 20     ALT 04/20/2023 19     Alkaline Phosphatase 04/20/2023 96     Total Protein 04/20/2023 7 2     Albumin 04/20/2023 3 7     Total Bilirubin 04/20/2023 0 68     eGFR 04/20/2023 116     Cholesterol 04/20/2023 170     Triglycerides 04/20/2023 74     HDL, Direct 04/20/2023 75     LDL Calculated 04/20/2023 80     Non-HDL-Chol (CHOL-HDL) 04/20/2023 95     TSH 3RD GENERATON 04/20/2023 1 720     PSA 04/20/2023 0 4     Vit D, 25-Hydroxy 04/20/2023 31 1          Radiology Results:   CT lung screening program    Result Date: 5/1/2023  Narrative: CT CHEST LUNG CANCER SCREENING WITHOUT IV CONTRAST INDICATION:   Z87 891: Personal history of nicotine dependence  COMPARISON: None  TECHNIQUE:  Unenhanced CT examination of the chest was performed utilizing a low dose protocol  Multiplanar 2D reformatted images were created from the source data  Radiation dose length product (DLP) for this visit:  130 57 mGy-cm   This examination, like all CT scans performed in the [facility], was performed utilizing techniques to minimize radiation dose exposure, including the use of iterative  reconstruction and automated exposure control  FINDINGS: LUNGS: Patent central airways  Moderate upper lobe predominant centrilobular emphysema  No focal consolidation  Scattered small calcified granulomas  Few scattered right lung nodules measuring 1-2 mm in size (for example, series 4 images 86, 99, 114, 157, 162)  No mass or suspicious nodule  PLEURA:  Unremarkable  HEART/GREAT VESSELS: Normal cardiac size  Mild coronary artery calcifications  No thoracic aortic aneurysm  MEDIASTINUM AND IFTIKHAR:  Unremarkable  CHEST WALL AND LOWER NECK:  Unremarkable  VISUALIZED STRUCTURES IN THE UPPER ABDOMEN: Calcified gallstones  Partially imaged right renal cortical cyst  OSSEOUS STRUCTURES: Degenerative changes of the spine with dextroconvex scoliosis  Impression: 1  No suspicious pulmonary nodule  2  Moderate emphysema  3  Cholelithiasis  Lung-RADS2, benign appearance or behavior  Continue annual screening with LDCT in 12 months   Workstation performed: [id number]     VAS carotid complete study    Result Date: 5/1/2023  Narrative:  [facility] REPORT CLINICAL: Indications:  Patient presents with a cervical bruit and multiple cardiovascular risk factors  Patient is asymptomatic from a cerebral vascular standpoint  Operative History: Patient denies any previous cardiovascular surgery Risk Factors: Hyperlipidemia and smoking (current) 1-2 ppd  Clinical Right Pressure:  142/68 mm Hg, Left Pressure:  144/70 mm Hg  FINDINGS:  Right        Impression  PSV  EDV (cm/s)  Direction of Flow  Ratio  Dist  ICA                 62          18                      0 73  Mid  ICA                  41          10                      0 48  Prox  ICA    1 - 49%      69          16                      0 80  Dist CCA                  74          13                            Mid CCA                   86          13                      0 96  Prox CCA                  89          11                            Ext Carotid               97           6                      1 14  Prox Vert                 53          16  Antegrade                 Subclavian               129           0                             Left         Impression  PSV  EDV (cm/s)  Direction of Flow  Ratio  Dist  ICA                 49          19                      0 71  Mid  ICA                  47          14                      0 68  Prox  ICA    1 - 49%      45          12                      0 65  Dist CCA                  65          13                            Mid CCA                   69          14                      1 17  Prox CCA                  59          12                            Ext Carotid              102          14                      1 48  Prox Vert                 63          15  Antegrade                 Subclavian               152           0                               CONCLUSION:  Impression RIGHT: There is <50% stenosis in the internal carotid artery  Plaque is heterogenous and irregular  The extracranial arteries are tortuous   Vertebral artery flow is antegrade  There is no significant subclavian artery disease  LEFT: There is <50% stenosis in the internal carotid artery  Plaque is homogenous and smooth  The extracranial arteries are tortuous  Vertebral artery flow is antegrade  There is no significant subclavian artery disease  Compared to previous study on 6/12/2013, there is no significant change    SIGNATURE: Electronically Signed by: Towanda Dakin on 2023-05-01 05:49:19 PM

## 2023-05-03 NOTE — PATIENT INSTRUCTIONS
Scheduled date of colonoscopy (as of today):05 19 23  Physician performing colonoscopy:DR Berry Ntaarajan  Location of colonoscopy:[facility]  Bowel prep reviewed with patient:EDER  Instructions reviewed with patient by:ANUSHKA  Clearances:  N/A

## 2023-05-19 ENCOUNTER — HOSPITAL ENCOUNTER (OUTPATIENT)
Dept: GASTROENTEROLOGY | Facility: HOSPITAL | Age: 78
Setting detail: OUTPATIENT SURGERY
End: 2023-05-19

## 2023-05-19 ENCOUNTER — ANESTHESIA EVENT (OUTPATIENT)
Dept: GASTROENTEROLOGY | Facility: HOSPITAL | Age: 78
End: 2023-05-19

## 2023-05-19 ENCOUNTER — ANESTHESIA (OUTPATIENT)
Dept: GASTROENTEROLOGY | Facility: HOSPITAL | Age: 78
End: 2023-05-19

## 2023-05-19 VITALS
OXYGEN SATURATION: 99 % | RESPIRATION RATE: 22 BRPM | SYSTOLIC BLOOD PRESSURE: 146 MMHG | DIASTOLIC BLOOD PRESSURE: 70 MMHG | HEART RATE: 69 BPM | TEMPERATURE: 97.9 F | BODY MASS INDEX: 24.98 KG/M2 | HEIGHT: 63 IN | WEIGHT: 141 LBS

## 2023-05-19 DIAGNOSIS — R19.5 POSITIVE COLORECTAL CANCER SCREENING USING COLOGUARD TEST: ICD-10-CM

## 2023-05-19 DIAGNOSIS — Z12.11 COLON CANCER SCREENING: ICD-10-CM

## 2023-05-19 RX ORDER — SODIUM CHLORIDE 9 MG/ML
INJECTION, SOLUTION INTRAVENOUS CONTINUOUS PRN
Status: DISCONTINUED | OUTPATIENT
Start: 2023-05-19 | End: 2023-05-19

## 2023-05-19 RX ORDER — PROPOFOL 10 MG/ML
INJECTION, EMULSION INTRAVENOUS AS NEEDED
Status: DISCONTINUED | OUTPATIENT
Start: 2023-05-19 | End: 2023-05-19

## 2023-05-19 RX ADMIN — PROPOFOL 30 MG: 10 INJECTION, EMULSION INTRAVENOUS at 10:24

## 2023-05-19 RX ADMIN — PROPOFOL 30 MG: 10 INJECTION, EMULSION INTRAVENOUS at 10:28

## 2023-05-19 RX ADMIN — PROPOFOL 20 MG: 10 INJECTION, EMULSION INTRAVENOUS at 10:36

## 2023-05-19 RX ADMIN — SODIUM CHLORIDE: 0.9 INJECTION, SOLUTION INTRAVENOUS at 10:12

## 2023-05-19 RX ADMIN — PROPOFOL 20 MG: 10 INJECTION, EMULSION INTRAVENOUS at 10:19

## 2023-05-19 RX ADMIN — PROPOFOL 100 MG: 10 INJECTION, EMULSION INTRAVENOUS at 10:13

## 2023-05-19 NOTE — ANESTHESIA POSTPROCEDURE EVALUATION
Post-Op Assessment Note    CV Status:  Stable  Pain Score: 0    Pain management: adequate     Mental Status:  Awake   Hydration Status:  Euvolemic   PONV Controlled:  None   Airway Patency:  Patent      Post Op Vitals Reviewed: Yes      Staff: CRNA         No notable events documented      BP   128/68   Temp      Pulse 95   Resp 18   SpO2 98%

## 2023-05-19 NOTE — INTERVAL H&P NOTE
H&P reviewed  After examining the patient I find no changes in the patients condition since the H&P had been written      Vitals:    05/19/23 0946   BP: 146/67   Pulse: 74   Resp: 16   Temp: 97 9 °F (36 6 °C)   SpO2: 99%

## 2023-05-19 NOTE — ANESTHESIA PREPROCEDURE EVALUATION
Procedure:  COLONOSCOPY    Relevant Problems   CARDIO   (+) Hyperlipidemia      /RENAL   (+) Benign prostatic hyperplasia      MUSCULOSKELETAL   (+) Scoliosis        Physical Exam    Airway    Mallampati score: II  TM Distance: >3 FB  Neck ROM: full     Dental       Cardiovascular      Pulmonary      Other Findings        Anesthesia Plan  ASA Score- 2     Anesthesia Type- IV sedation with anesthesia with ASA Monitors  Additional Monitors:   Airway Plan:           Plan Factors-    Chart reviewed  Induction- intravenous  Postoperative Plan-     Informed Consent- Anesthetic plan and risks discussed with patient  I personally reviewed this patient with the CRNA  Discussed and agreed on the Anesthesia Plan with the CRNA  Mikal Hooks

## 2023-05-31 NOTE — PROGRESS NOTES
[facility] - PROCEDURE RESPONSE NOTE    Based on the query that was sent to you, please document below any procedures that were performed  All polyps were removed with cold snare

## 2023-06-05 ENCOUNTER — OFFICE VISIT (OUTPATIENT)
Dept: FAMILY MEDICINE CLINIC | Facility: CLINIC | Age: 78
End: 2023-06-05
Payer: COMMERCIAL

## 2023-06-05 VITALS
SYSTOLIC BLOOD PRESSURE: 128 MMHG | HEART RATE: 78 BPM | OXYGEN SATURATION: 98 % | DIASTOLIC BLOOD PRESSURE: 74 MMHG | RESPIRATION RATE: 17 BRPM | BODY MASS INDEX: 24.98 KG/M2 | HEIGHT: 63 IN | WEIGHT: 141 LBS

## 2023-06-05 DIAGNOSIS — Z00.00 MEDICARE ANNUAL WELLNESS VISIT, SUBSEQUENT: Primary | ICD-10-CM

## 2023-06-05 PROCEDURE — G0439 PPPS, SUBSEQ VISIT: HCPCS | Performed by: NURSE PRACTITIONER

## 2023-06-05 NOTE — PROGRESS NOTES
Assessment and Plan:  1  Immunizations UTD  2  Fasting labs UTD  3  Colonoscopy UTD  4  F/u in 1 year for annual physical or sooner PRN  Problem List Items Addressed This Visit    None  Visit Diagnoses     Medicare annual wellness visit, subsequent    -  Primary        BMI Counseling: Body mass index is 25 38 kg/m²  The BMI is above normal  Nutrition recommendations include decreasing portion sizes, encouraging healthy choices of fruits and vegetables, decreasing fast food intake, consuming healthier snacks, limiting drinks that contain sugar, moderation in carbohydrate intake, increasing intake of lean protein, reducing intake of saturated and trans fat and reducing intake of cholesterol  Exercise recommendations include moderate physical activity 150 minutes/week  No pharmacotherapy was ordered  Rationale for BMI follow-up plan is due to patient being overweight or obese  Depression Screening and Follow-up Plan: Patient was screened for depression during today's encounter  They screened negative with a PHQ-2 score of 0  Preventive health issues were discussed with patient, and age appropriate screening tests were ordered as noted in patient's After Visit Summary  Personalized health advice and appropriate referrals for health education or preventive services given if needed, as noted in patient's After Visit Summary  History of Present Illness:     Patient presents for a Medicare Wellness Visit    HPI   Patient Care Team:  Love Vigil as PCP - General (Family Medicine)  MD Mavis Fenton MD     Review of Systems:     Review of Systems   Constitutional: Negative  HENT: Negative  Respiratory: Negative  Negative for cough  Cardiovascular: Negative  Gastrointestinal: Negative  Genitourinary: Negative  Musculoskeletal: Negative  Negative for myalgias  Neurological: Negative  Psychiatric/Behavioral: Negative           Problem List:     Patient Active Problem List   Diagnosis   • Hyperlipidemia   • Benign prostatic hyperplasia   • Erectile dysfunction   • Impairment of balance   • Microscopic hematuria   • Polio   • Post-polio syndrome   • Allergic rhinitis   • Scoliosis   • Vitamin D deficiency      Past Medical and Surgical History:     Past Medical History:   Diagnosis Date   • Anemia     [id number]  RESOLVED   • Gastric ulcer    • Polio    • Scoliosis      Past Surgical History:   Procedure Laterality Date   • COLONOSCOPY     • FEMUR FRACTURE SURGERY Right    • FOOT SURGERY      X2   • LEG SURGERY     • SHOULDER SURGERY Left       Family History:     Family History   Problem Relation Age of Onset   • Diabetes Mother    • Thyroid disease Mother    • Emphysema Father    • Coronary artery disease Maternal Grandfather    • Mental illness Neg Hx    • Substance Abuse Neg Hx       Social History:     Social History     Socioeconomic History   • Marital status: /Civil Union     Spouse name: None   • Number of children: None   • Years of education: None   • Highest education level: None   Occupational History   • None   Tobacco Use   • Smoking status: Former     Packs/day: 1 00     Types: Cigarettes     Quit date: 2022     Years since quittin 8   • Smokeless tobacco: Never   Vaping Use   • Vaping Use: Never used   Substance and Sexual Activity   • Alcohol use: Yes     Comment: Rare use    • Drug use: No   • Sexual activity: None   Other Topics Concern   • None   Social History Narrative    ADVANCE DIRECTIVE ON FILE    ALWAYS USES SEAT BELT    CAFFEINE USE   3-4 CUPS COFFEE A DAY ICED TEA DURING DAY     Social Determinants of Health     Financial Resource Strain: Low Risk  (2023)    Overall Financial Resource Strain (CARDIA)    • Difficulty of Paying Living Expenses: Not hard at all   Food Insecurity: Not on file   Transportation Needs: No Transportation Needs (2023)    PRAPARE - Transportation    • Lack of Transportation (Medical):  No    • Lack of Transportation (Non-Medical): No   Physical Activity: Not on file   Stress: Not on file   Social Connections: Not on file   Intimate Partner Violence: Not on file   Housing Stability: Not on file      Medications and Allergies:     Current Outpatient Medications   Medication Sig Dispense Refill   • potassium chloride (MICRO-K) 10 MEQ CR capsule Take by mouth     • Cholecalciferol (VITAMIN D3) 1000 units CAPS Take by mouth daily     • cyanocobalamin (VITAMIN B-12) 100 mcg tablet Take 1 tablet by mouth 2 (two) times a day     • Glucosamine-Chondroitin 750-600 MG TABS Take 1 tablet by mouth 2 (two) times a day     • polyethylene glycol (GOLYTELY) 4000 mL solution Take as directed by the office for colonoscopy  4000 mL 0   • pyridoxine (VITAMIN B6) 100 mg tablet Take 1 tablet by mouth 2 (two) times a day       No current facility-administered medications for this visit  No Known Allergies   Immunizations:     Immunization History   Administered Date(s) Administered   • COVID-19 MODERNA VACC 0 5 ML IM 04/10/2021, 05/08/2021, 11/29/2021   • INFLUENZA 12/15/2015, 09/20/2016, 11/29/2021   • Influenza Split High Dose Preservative Free IM 01/20/2014, 10/08/2014, 12/15/2015, 09/20/2016   • Influenza, Seasonal Vaccine, Quadrivalent, Adjuvanted,  5e 11/29/2021   • Influenza, injectable, quadrivalent, preservative free 0 5 mL 10/17/2018   • Influenza, seasonal, injectable 10/22/2012   • Pneumococcal Conjugate Vaccine 20-valent (Pcv20), Polysace 03/27/2023   • TD (adult) Preservative Free 09/20/2016   • Td (adult), adsorbed 09/20/2016      Health Maintenance:         Topic Date Due   • Hepatitis C Screening  04/23/2025 (Originally 1945)   • Colorectal Cancer Screening  Discontinued         Topic Date Due   • Influenza Vaccine (Season Ended) 09/01/2023      Medicare Screening Tests and Risk Assessments:     Mary Anne Evans is here for his Subsequent Wellness visit   Last Medicare Wellness visit information reviewed, patient interviewed, no change since last AWV  Health Risk Assessment:   Patient rates overall health as good  Patient feels that their physical health rating is slightly worse  Patient is dissatisfied with their life  Eyesight was rated as slightly worse  Hearing was rated as slightly worse  Patient feels that their emotional and mental health rating is slightly worse  Patients states they are never, rarely angry  Patient states they are sometimes unusually tired/fatigued  Pain experienced in the last 7 days has been none  Patient states that he has experienced no weight loss or gain in last 6 months  Depression Screening:   PHQ-2 Score: 0      Fall Risk Screening: In the past year, patient has experienced: history of falling in past year    Number of falls: 1  Injured during fall?: No    Feels unsteady when standing or walking?: Yes    Worried about falling?: Yes      Home Safety:  Patient has trouble with stairs inside or outside of their home  Patient has no working smoke alarms and has no working carbon monoxide detector  Home safety hazards include: none  Pt offered PT, however he is declining this  He notes he does strengthening exercises on his own  Pt does have his daughter now living with him to help with his laundry  Pt encouraged to reach out if he has any additional needs that may arise  Nutrition:   Current diet is Regular  Medications:   Patient is not currently taking any over-the-counter supplements  Patient is able to manage medications  Activities of Daily Living (ADLs)/Instrumental Activities of Daily Living (IADLs):   Walk and transfer into and out of bed and chair?: Yes  Dress and groom yourself?: Yes    Bathe or shower yourself?: Yes    Feed yourself?  Yes  Do your laundry/housekeeping?: No  Manage your money, pay your bills and track your expenses?: Yes  Make your own meals?: Yes    Do your own shopping?: No    Previous Hospitalizations:   Any hospitalizations or ED visits within "the last 12 months?: No      Advance Care Planning:   Living will: Yes    Advanced directive: Yes      PREVENTIVE SCREENINGS      Cardiovascular Screening:    General: History Lipid Disorder and Screening Current      Diabetes Screening:     General: Screening Current      Colorectal Cancer Screening:     General: Screening Current      Prostate Cancer Screening:    General: Screening Not Indicated      Osteoporosis Screening:    General: Screening Not Indicated      Abdominal Aortic Aneurysm (AAA) Screening:    Risk factors include: tobacco use        Lung Cancer Screening:     General: Screening Not Indicated      Hepatitis C Screening:    General: Screening Current    Screening, Brief Intervention, and Referral to Treatment (SBIRT)    Screening  Typical number of drinks in a day: 0  Typical number of drinks in a week: 1  Interpretation: Low risk drinking behavior  Single Item Drug Screening:  How often have you used an illegal drug (including marijuana) or a prescription medication for non-medical reasons in the past year? never    Single Item Drug Screen Score: 0  Interpretation: Negative screen for possible drug use disorder    Other Counseling Topics:   Car/seat belt/driving safety, skin self-exam and calcium and vitamin D intake and regular weightbearing exercise  No results found  Physical Exam:     /74   Pulse 78   Resp 17   Ht 5' 2 5\" (1 588 m)   Wt 64 kg (141 lb)   SpO2 98%   BMI 25 38 kg/m²     Physical Exam  Vitals and nursing note reviewed  Constitutional:       General: He is not in acute distress  Appearance: He is well-developed  HENT:      Head: Normocephalic and atraumatic  Eyes:      Conjunctiva/sclera: Conjunctivae normal    Cardiovascular:      Rate and Rhythm: Normal rate and regular rhythm  Heart sounds: No murmur heard  Pulmonary:      Effort: Pulmonary effort is normal  No respiratory distress  Breath sounds: Normal breath sounds     Abdominal:     " Palpations: Abdomen is soft  Tenderness: There is no abdominal tenderness  Musculoskeletal:         General: No swelling  Cervical back: Neck supple  Right lower leg: Edema (trace, starting from ankle to mid calf, no redness, tenderness or heat noted) present  Left lower leg: Edema (trace, starting from ankle to mid calf, no redness, tenderness or heat noted) present  Skin:     General: Skin is warm and dry  Capillary Refill: Capillary refill takes less than 2 seconds  Neurological:      Mental Status: He is alert     Psychiatric:         Mood and Affect: Mood normal           NELL Grover

## 2023-11-15 ENCOUNTER — TELEPHONE (OUTPATIENT)
Dept: FAMILY MEDICINE CLINIC | Facility: CLINIC | Age: 78
End: 2023-11-15

## 2023-11-15 NOTE — TELEPHONE ENCOUNTER
Patient is calling he saw you in June and spoke to you about his difficulty going up and down steps. He is asking if you can put a referral for him to get physical therapy at home something to help with going up and down the steps. He stated he is post polio.

## 2023-11-16 DIAGNOSIS — R53.1 GENERALIZED WEAKNESS: Primary | ICD-10-CM

## 2023-11-16 DIAGNOSIS — Z86.12 HISTORY OF POLIOMYELITIS: ICD-10-CM

## 2023-11-21 ENCOUNTER — PATIENT OUTREACH (OUTPATIENT)
Dept: FAMILY MEDICINE CLINIC | Facility: CLINIC | Age: 78
End: 2023-11-21

## 2023-11-21 DIAGNOSIS — R53.1 GENERALIZED WEAKNESS: Primary | ICD-10-CM

## 2023-11-21 NOTE — PROGRESS NOTES
PACHECO COTTON received referral for patient for in home PT. PACHECO COTTON sent patient's PCP an inbasket message and received response that referral was intended for  [facility] in home PT program. PACHECO Cotton will close. Please re consult PACHECO COTTON as needed.

## 2023-11-22 ENCOUNTER — TELEPHONE (OUTPATIENT)
Dept: FAMILY MEDICINE CLINIC | Facility: CLINIC | Age: 78
End: 2023-11-22

## 2023-11-22 NOTE — TELEPHONE ENCOUNTER
[facility] called to say her dept only takes medicare. You need to change the pts referral to the traditional in home therapy. Hoping to get him set up for next week.

## 2023-11-27 DIAGNOSIS — R53.1 GENERALIZED WEAKNESS: Primary | ICD-10-CM

## 2023-11-27 NOTE — TELEPHONE ENCOUNTER
Romayne Ali called again and said the referral is not correct. I called [facility] intake and spoke with RN, Val Mcgowan and she said they would not be able to act on it. She said you have to choose Amb Referral to Home Health as the reason, not only write it in the body/order information section. She said the other problem is that the pt has not been seen for this specific issue. Ins requires an office visit note on file within the last 90 days to schedule. Do you want me to set up an appt with him?

## 2023-11-27 NOTE — TELEPHONE ENCOUNTER
Pt can only make a virtual visit - he is currently home bound. I tentatively set up an appt on Thurs. , 11/30 @ 4:15. Please let me know if this is agreeable to you. He also provided a personal [employer] cell phone #: [phone].

## 2023-11-28 ENCOUNTER — RA CDI HCC (OUTPATIENT)
Dept: OTHER | Facility: HOSPITAL | Age: 78
End: 2023-11-28

## 2023-11-28 NOTE — PROGRESS NOTES
[facility] [address] [facility] coding opportunities       Chart reviewed, no opportunity found: [address]        Patients Insurance     Medicare Insurance: [employer] Advantage

## 2023-11-30 ENCOUNTER — TELEMEDICINE (OUTPATIENT)
Dept: FAMILY MEDICINE CLINIC | Facility: CLINIC | Age: 78
End: 2023-11-30
Payer: COMMERCIAL

## 2023-11-30 VITALS — WEIGHT: 145 LBS | HEIGHT: 64 IN | BODY MASS INDEX: 24.75 KG/M2

## 2023-11-30 DIAGNOSIS — Z86.12 HISTORY OF POST-POLIO SYNDROME: ICD-10-CM

## 2023-11-30 DIAGNOSIS — R53.1 GENERALIZED WEAKNESS: Primary | ICD-10-CM

## 2023-11-30 PROCEDURE — 99213 OFFICE O/P EST LOW 20 MIN: CPT | Performed by: NURSE PRACTITIONER

## 2023-11-30 NOTE — PROGRESS NOTES
Virtual Regular Visit    Verification of patient location:    Patient is located at Home in the following state in which I hold an active license [place]      Assessment/Plan:    Problem List Items Addressed This Visit    None  Visit Diagnoses       Generalized weakness    -  Primary    Relevant Orders    Referral to [address] [facility]    History of post-polio syndrome        Relevant Orders    Referral to [facility] [facility] [address] [facility]              Depression Screening and Follow-up Plan: Patient was screened for depression during today's encounter. They screened negative with a PHQ-2 score of 0. Reason for visit is   Chief Complaint   Patient presents with    Needs Referral     For at home Therapy    Virtual Regular Visit          Encounter provider NELL Petersen    Provider located at [address]  [phone]      Recent Visits  No visits were found meeting these conditions. Showing recent visits within past 7 days and meeting all other requirements  Today's Visits  Date Type Provider Dept   11/30/23 Telemedicine NELL Petersen [address]   Showing today's visits and meeting all other requirements  Future Appointments  No visits were found meeting these conditions. Showing future appointments within next 150 days and meeting all other requirements       The patient was identified by name and date of birth. Lashanda Pretty was informed that this is a telemedicine visit and that the visit is being conducted through the [employer]. He agrees to proceed. .  My office door was closed. No one else was in the room. He acknowledged consent and understanding of privacy and security of the video platform. The patient has agreed to participate and understands they can discontinue the visit at any time.     Patient is aware this is a [facility] service. Subjective  Aries Amaro is a 69 y.o. male  . Pt presents w/ his daughter for concerns of worsening weakness over the last several months R>L. Pt does have hx of polio and foot drop. Pt previously was using a cane for ambulation but now is relying on walker. Pt also notes difficulty w/ steps and getting up from a supine position. He would benefit from home PT. He does note have transportation or the means to complete o/p PT. Past Medical History:   Diagnosis Date    Anemia     [id number]  RESOLVED    Gastric ulcer     Polio     Scoliosis        Past Surgical History:   Procedure Laterality Date    COLONOSCOPY      FEMUR FRACTURE SURGERY Right     FOOT SURGERY      X2    LEG SURGERY      SHOULDER SURGERY Left        Current Outpatient Medications   Medication Sig Dispense Refill    Cholecalciferol (VITAMIN D3) 1000 units CAPS Take by mouth daily      cyanocobalamin (VITAMIN B-12) 100 mcg tablet Take 1 tablet by mouth 2 (two) times a day      Glucosamine-Chondroitin 750-600 MG TABS Take 1 tablet by mouth 2 (two) times a day      potassium chloride (MICRO-K) 10 MEQ CR capsule Take by mouth      polyethylene glycol (GOLYTELY) 4000 mL solution Take as directed by the office for colonoscopy. (Patient not taking: Reported on 11/30/2023) 4000 mL 0    pyridoxine (VITAMIN B6) 100 mg tablet Take 1 tablet by mouth 2 (two) times a day (Patient not taking: Reported on 11/30/2023)       No current facility-administered medications for this visit. No Known Allergies    Review of Systems  As noted per HPI. Video Exam    Vitals:    11/30/23 1603   Weight: 65.8 kg (145 lb)   Height: 5' 3.5" (1.613 m)       Physical Exam  Vitals reviewed. Constitutional:       Appearance: Normal appearance. Pulmonary:      Effort: Pulmonary effort is normal.   Neurological:      Mental Status: He is alert and oriented to person, place, and time. Mental status is at baseline.    Psychiatric: Mood and Affect: Mood normal.         Behavior: Behavior normal.         Thought Content:  Thought content normal.         Judgment: Judgment normal.          Visit Time  Total Visit Duration: 15

## 2023-12-01 ENCOUNTER — TELEPHONE (OUTPATIENT)
Dept: FAMILY MEDICINE CLINIC | Facility: CLINIC | Age: 78
End: 2023-12-01

## 2023-12-01 ENCOUNTER — HOME CARE VISIT (OUTPATIENT)
Dept: HOME HEALTH SERVICES | Facility: HOME HEALTHCARE | Age: 78
End: 2023-12-01

## 2023-12-01 ENCOUNTER — HOME HEALTH ADMISSION (OUTPATIENT)
Dept: HOME HEALTH SERVICES | Facility: HOME HEALTHCARE | Age: 78
End: 2023-12-01
Payer: COMMERCIAL

## 2023-12-01 NOTE — CASE COMMUNICATION
Agreeable to [facility] services and no other SN agencies in home: x    Communication to the physician regarding delay: New SOC date 12/4    Insurance, copays, HB status reviewed: x    Verified address and phone number: x    Requested that patient secure pets: x    Medication bottles and DC instructions in home: x    Wound care/IV supplies in home: x    CG present to learn: x    Other concerns patient/family would like to address at visit: x

## 2023-12-01 NOTE — TELEPHONE ENCOUNTER
Patient has a 6 month recheck scheduled with you on 12/8. He called to say that he is now housebound and cannot come in for the visit. How do you want to handle this?

## 2023-12-01 NOTE — TELEPHONE ENCOUNTER
Yeni Hooper is following up with Josue Parisi today to make sure he calls PT Eamon Arthur to schedule. I also [facility] to call back and verify everything is in order for pt to start.

## 2023-12-04 ENCOUNTER — HOME CARE VISIT (OUTPATIENT)
Dept: HOME HEALTH SERVICES | Facility: HOME HEALTHCARE | Age: 78
End: 2023-12-04
Payer: COMMERCIAL

## 2023-12-04 PROCEDURE — 10330081 VN NO-PAY CLAIM PROCEDURE

## 2023-12-04 PROCEDURE — G0151 HHCP-SERV OF PT,EA 15 MIN: HCPCS

## 2023-12-04 PROCEDURE — 400013 VN SOC

## 2023-12-05 ENCOUNTER — HOME CARE VISIT (OUTPATIENT)
Dept: HOME HEALTH SERVICES | Facility: HOME HEALTHCARE | Age: 78
End: 2023-12-05
Payer: COMMERCIAL

## 2023-12-05 DIAGNOSIS — Z86.12 HISTORY OF POST-POLIO SYNDROME: Primary | ICD-10-CM

## 2023-12-06 ENCOUNTER — HOME CARE VISIT (OUTPATIENT)
Dept: HOME HEALTH SERVICES | Facility: HOME HEALTHCARE | Age: 78
End: 2023-12-06
Payer: COMMERCIAL

## 2023-12-08 ENCOUNTER — HOME CARE VISIT (OUTPATIENT)
Dept: HOME HEALTH SERVICES | Facility: HOME HEALTHCARE | Age: 78
End: 2023-12-08
Payer: COMMERCIAL

## 2023-12-08 VITALS — OXYGEN SATURATION: 97 % | HEART RATE: 76 BPM | SYSTOLIC BLOOD PRESSURE: 120 MMHG | DIASTOLIC BLOOD PRESSURE: 80 MMHG

## 2023-12-08 PROCEDURE — G0151 HHCP-SERV OF PT,EA 15 MIN: HCPCS

## 2023-12-08 PROCEDURE — G0152 HHCP-SERV OF OT,EA 15 MIN: HCPCS

## 2023-12-11 ENCOUNTER — HOME CARE VISIT (OUTPATIENT)
Dept: HOME HEALTH SERVICES | Facility: HOME HEALTHCARE | Age: 78
End: 2023-12-11
Payer: COMMERCIAL

## 2023-12-11 PROCEDURE — G0180 MD CERTIFICATION HHA PATIENT: HCPCS | Performed by: NURSE PRACTITIONER

## 2023-12-12 ENCOUNTER — HOME CARE VISIT (OUTPATIENT)
Dept: HOME HEALTH SERVICES | Facility: HOME HEALTHCARE | Age: 78
End: 2023-12-12
Payer: COMMERCIAL

## 2023-12-12 PROCEDURE — G0152 HHCP-SERV OF OT,EA 15 MIN: HCPCS

## 2023-12-12 PROCEDURE — G0151 HHCP-SERV OF PT,EA 15 MIN: HCPCS

## 2023-12-14 ENCOUNTER — HOME CARE VISIT (OUTPATIENT)
Dept: HOME HEALTH SERVICES | Facility: HOME HEALTHCARE | Age: 78
End: 2023-12-14
Payer: COMMERCIAL

## 2023-12-14 PROCEDURE — G0151 HHCP-SERV OF PT,EA 15 MIN: HCPCS

## 2023-12-14 PROCEDURE — G0152 HHCP-SERV OF OT,EA 15 MIN: HCPCS

## 2023-12-18 ENCOUNTER — HOME CARE VISIT (OUTPATIENT)
Dept: HOME HEALTH SERVICES | Facility: HOME HEALTHCARE | Age: 78
End: 2023-12-18
Payer: COMMERCIAL

## 2023-12-18 PROCEDURE — G0152 HHCP-SERV OF OT,EA 15 MIN: HCPCS

## 2023-12-20 ENCOUNTER — HOME CARE VISIT (OUTPATIENT)
Dept: HOME HEALTH SERVICES | Facility: HOME HEALTHCARE | Age: 78
End: 2023-12-20
Payer: COMMERCIAL

## 2023-12-20 PROCEDURE — G0151 HHCP-SERV OF PT,EA 15 MIN: HCPCS

## 2024-03-11 ENCOUNTER — TELEPHONE (OUTPATIENT)
Dept: FAMILY MEDICINE CLINIC | Facility: CLINIC | Age: 79
End: 2024-03-11

## 2024-03-11 NOTE — TELEPHONE ENCOUNTER
Patient needs a script for his stair lift.  He said he wouldn't have to pay taxes on the lift if he has a script for it.  He will make an appointment once he can get up and down the steps with his chair.

## 2025-03-27 ENCOUNTER — VBI (OUTPATIENT)
Dept: ADMINISTRATIVE | Facility: OTHER | Age: 80
End: 2025-03-27

## 2025-03-27 NOTE — TELEPHONE ENCOUNTER
Patient contacted to schedule Annual Wellness Visit.   A message was left for the patient to return the call.    Thank you.  Gely Mccollum MA  [facility]

## 2025-03-31 NOTE — TELEPHONE ENCOUNTER
Patient contacted to schedule Annual Wellness Visit.   Patient declined to schedule appointment.     Thank you.  Gely Mccollum MA  [facility]

## 2025-06-16 ENCOUNTER — TELEPHONE (OUTPATIENT)
Age: 80
End: 2025-06-16

## 2025-06-16 NOTE — TELEPHONE ENCOUNTER
Gi RN Case Manager from [facility] calling in because she spoke with pt recently and he stated he has not been able to leave his home in 2 years and has issues walking and previous DX of post polio. Gi was asking if office could call him later today and schedule a virtual visit with Jaimie and maybe have orders for PT and OT to start working on his walking; I tried to schedule the appt but there were different appt times available and Gi did not know which one the pt would be okay with.    Please advise and give pt a call to schedule.

## 2025-06-16 NOTE — TELEPHONE ENCOUNTER
Spoke to Veronica his daughter she will have her dad call back to schedule virtual 30 min when he wakes up.

## 2025-06-17 DIAGNOSIS — Z86.12 HISTORY OF POLIOMYELITIS: ICD-10-CM

## 2025-06-17 DIAGNOSIS — R53.1 GENERALIZED WEAKNESS: ICD-10-CM

## 2025-06-17 DIAGNOSIS — Z86.12 HISTORY OF POST-POLIO SYNDROME: Primary | ICD-10-CM

## 2025-06-17 NOTE — TELEPHONE ENCOUNTER
Pt rep from ins co called stating she spoke with pt and he feels he should be seen in-person for visit as he hasn't been seen in awhile. Pt is concerned with the ramp that goes into building and is asking if there is a staff member that can assist as his daughter who would come with him has physical disabilities also.    Pt also requested home care orders for physical therapy ahead of his visit on 6/26/25.    Please call pt to discuss, thank you.

## 2025-06-26 ENCOUNTER — TELEMEDICINE (OUTPATIENT)
Dept: FAMILY MEDICINE CLINIC | Facility: CLINIC | Age: 80
End: 2025-06-26
Payer: COMMERCIAL

## 2025-06-26 VITALS — BODY MASS INDEX: 26.66 KG/M2 | WEIGHT: 160 LBS | HEIGHT: 65 IN

## 2025-06-26 DIAGNOSIS — Z86.12 HISTORY OF POST-POLIO SYNDROME: ICD-10-CM

## 2025-06-26 DIAGNOSIS — Z86.12 HISTORY OF POLIOMYELITIS: ICD-10-CM

## 2025-06-26 DIAGNOSIS — R53.1 GENERALIZED WEAKNESS: ICD-10-CM

## 2025-06-26 DIAGNOSIS — Z71.3 NUTRITIONAL COUNSELING: ICD-10-CM

## 2025-06-26 DIAGNOSIS — Z00.00 MEDICARE ANNUAL WELLNESS VISIT, SUBSEQUENT: Primary | ICD-10-CM

## 2025-06-26 PROCEDURE — G0439 PPPS, SUBSEQ VISIT: HCPCS | Performed by: NURSE PRACTITIONER

## 2025-06-26 NOTE — PROGRESS NOTES
Name: Juan Castillo      : 1945      MRN: [id number]  Encounter Provider: NELL Thacker  Encounter Date: 2025   Encounter department: [facility]  :  Assessment & Plan  Medicare annual wellness visit, subsequent         History of post-polio syndrome    Orders:    Ambulatory Referral to Social Work Care Management Program; Future    History of poliomyelitis    Orders:    Ambulatory Referral to Social Work Care Management Program; Future    Generalized weakness    Orders:    Ambulatory Referral to Social Work Care Management Program; Future    Nutritional counseling    Orders:    Ambulatory Referral to Nutrition Services; Future      Depression Screening and Follow-up Plan: Patient was screened for depression during today's encounter. They screened negative with a PHQ-2 score of 1.      Falls Plan of Care: balance, strength, and gait training instructions were provided. not completed because patient not ambulatory, bed ridden, immobile, confined to chair, or wheelchair bound.       Preventive health issues were discussed with patient, and age appropriate screening tests were ordered as noted in patient's After Visit Summary. Personalized health advice and appropriate referrals for health education or preventive services given if needed, as noted in patient's After Visit Summary.    History of Present Illness     Pt presents today virtually AWV.  He notes worsening weakness.  Pt has hx of polio.  Pt needs resources at home such as PT.       Pt was present at home in his state of [place] at time of virtual visit.  My room door was closed.  Epic Embedded video was used for this visit.     Patient Care Team:  NELL Thacker as PCP - General (Family Medicine)  MD Dhruv Figueroa MD Leigh Steckel, RN as RN Care Manager (Care Coordination)    Review of Systems   Constitutional: Negative.    HENT: Negative.     Respiratory:  Negative.  Negative for cough.    Cardiovascular: Negative.    Gastrointestinal: Negative.    Genitourinary: Negative.    Musculoskeletal: Negative.  Negative for myalgias.   Neurological: Negative.    Psychiatric/Behavioral: Negative.       Medical History Reviewed by provider this encounter:  Meds  Problems       Annual Wellness Visit Questionnaire   Juan is here for his Subsequent Wellness visit.     Health Risk Assessment:   Patient rates overall health as good. Patient feels that their physical health rating is slightly worse. Patient is satisfied with their life. Eyesight was rated as same. Hearing was rated as same. Patient feels that their emotional and mental health rating is same. Patients states they are sometimes angry. Patient states they are sometimes unusually tired/fatigued. Pain experienced in the last 7 days has been none. Patient states that he has experienced weight loss or gain in last 6 months.     Depression Screening:   PHQ-2 Score: 1      Fall Risk Screening:   In the past year, patient has experienced: history of falling in past year    Number of falls: 1  Injured during fall?: Yes    Feels unsteady when standing or walking?: Yes    Worried about falling?: Yes      Home Safety:  Patient has trouble with stairs inside or outside of their home. Patient has working smoke alarms and has working carbon monoxide detector. Home safety hazards include: none.     Nutrition:   Current diet is Regular.     Medications:   Patient is currently taking over-the-counter supplements. OTC medications include: see medication list. Patient is able to manage medications.     Activities of Daily Living (ADLs)/Instrumental Activities of Daily Living (IADLs):   Walk and transfer into and out of bed and chair?: Yes  Dress and groom yourself?: Yes    Bathe or shower yourself?: Yes    Feed yourself? Yes  Do your laundry/housekeeping?: No  Manage your money, pay your bills and track your expenses?: Yes  Make your  own meals?: No    Do your own shopping?: No    Previous Hospitalizations:   Any hospitalizations or ED visits within the last 12 months?: No      Advance Care Planning:   Living will: Yes    Advanced directive: Yes      Preventive Screenings      Cardiovascular Screening:    General: Screening Not Indicated and History Lipid Disorder      Colorectal Cancer Screening:     General: Screening Current      Prostate Cancer Screening:    General: Screening Not Indicated      Abdominal Aortic Aneurysm (AAA) Screening:    Risk factors include: tobacco use        Lung Cancer Screening:     General: Screening Not Indicated      Hepatitis C Screening:    General: Screening Current    Immunizations:  - Immunizations due: Zoster (Shingrix)    Screening, Brief Intervention, and Referral to Treatment (SBIRT)     Screening  Typical number of drinks in a day: 0  Typical number of drinks in a week: 0  Interpretation: Low risk drinking behavior.    Single Item Drug Screening:  How often have you used an illegal drug (including marijuana) or a prescription medication for non-medical reasons in the past year? never    Single Item Drug Screen Score: 0  Interpretation: Negative screen for possible drug use disorder    SDOH Risk Assessment  Social determinants of health (SDOH) risk assesment tool was completed. The tool at a minimum covered housing stability, food insecurity, transportation needs, and utility difficulty. Patient had at risk responses for the following SDOH domains: transportation needs.     Other Counseling Topics:   Calcium and vitamin D intake and regular weightbearing exercise.     Social Drivers of Health     Financial Resource Strain: Low Risk  (6/5/2023)    Overall Financial Resource Strain (CARDIA)     Difficulty of Paying Living Expenses: Not hard at all   Food Insecurity: No Food Insecurity (6/26/2025)    Nursing - Inadequate Food Risk Classification     Worried About Running Out of Food in the Last Year: Never  "true     Ran Out of Food in the Last Year: Never true   Transportation Needs: Unmet Transportation Needs (6/26/2025)    PRAPARE - Transportation     Lack of Transportation (Medical): Yes     Lack of Transportation (Non-Medical): Yes   Housing Stability: Low Risk  (6/26/2025)    Housing Stability Vital Sign     Unable to Pay for Housing in the Last Year: No     Number of Times Moved in the Last Year: 0     Homeless in the Last Year: No   Utilities: Not At Risk (6/26/2025)    [facility] Utilities     Threatened with loss of utilities: No     No results found.    Objective   Ht 5' 4.5\" (1.638 m)   Wt 72.6 kg (160 lb)   BMI 27.04 kg/m²     Physical Exam  Vitals reviewed.   Constitutional:       Appearance: Normal appearance.   Pulmonary:      Effort: Pulmonary effort is normal.     Neurological:      Mental Status: He is alert and oriented to person, place, and time. Mental status is at baseline.     Psychiatric:         Mood and Affect: Mood normal.         Behavior: Behavior normal.         Thought Content: Thought content normal.         Judgment: Judgment normal.       "

## 2025-06-26 NOTE — PATIENT INSTRUCTIONS
Medicare Preventive Visit Patient Instructions  Thank you for completing your Welcome to Medicare Visit or Medicare Annual Wellness Visit today. Your next wellness visit will be due in one year (6/27/2026).  The screening/preventive services that you may require over the next 5-10 years are detailed below. Some tests may not apply to you based off risk factors and/or age. Screening tests ordered at today's visit but not completed yet may show as past due. Also, please note that scanned in results may not display below.  Preventive Screenings:  Service Recommendations Previous Testing/Comments   Colorectal Cancer Screening  Colonoscopy    Fecal Occult Blood Test (FOBT)/Fecal Immunochemical Test (FIT)  Fecal DNA/Cologuard Test  Flexible Sigmoidoscopy Age: 45-75 years old   Colonoscopy: every 10 years (May be performed more frequently if at higher risk)  OR  FOBT/FIT: every 1 year  OR  Cologuard: every 3 years  OR  Sigmoidoscopy: every 5 years  Screening may be recommended earlier than age 45 if at higher risk for colorectal cancer. Also, an individualized decision between you and your healthcare provider will decide whether screening between the ages of 76-85 would be appropriate. Colonoscopy: 05/19/2023  FOBT/FIT: Not on file  Cologuard: Not on file  Sigmoidoscopy: Not on file    Screening Current     Prostate Cancer Screening Individualized decision between patient and health care provider in men between ages of 55-69   Medicare will cover every 12 months beginning on the day after your 50th birthday PSA: 0.4 ng/mL     Screening Not Indicated     Hepatitis C Screening Once for adults born between 1945 and 1965  More frequently in patients at high risk for Hepatitis C Hep C Antibody: Not on file    Screening Current   Diabetes Screening 1-2 times per year if you're at risk for diabetes or have pre-diabetes Fasting glucose: 90 mg/dL (4/20/2023)  A1C: No results in last 5 years (No results in last 5 years)       Cholesterol Screening Once every 5 years if you don't have a lipid disorder. May order more often based on risk factors. Lipid panel: 04/20/2023  Screening Not Indicated  History Lipid Disorder      Other Preventive Screenings Covered by Medicare:  Abdominal Aortic Aneurysm (AAA) Screening: covered once if your at risk. You're considered to be at risk if you have a family history of AAA or a male between the age of 65-75 who smoking at least 100 cigarettes in your lifetime.  Lung Cancer Screening: covers low dose CT scan once per year if you meet all of the following conditions: (1) Age 55-77; (2) No signs or symptoms of lung cancer; (3) Current smoker or have quit smoking within the last 15 years; (4) You have a tobacco smoking history of at least 20 pack years (packs per day x number of years you smoked); (5) You get a written order from a healthcare provider.  Glaucoma Screening: covered annually if you're considered high risk: (1) You have diabetes OR (2) Family history of glaucoma OR (3)  aged 50 and older OR (4)  American aged 65 and older  Osteoporosis Screening: covered every 2 years if you meet one of the following conditions: (1) Have a vertebral abnormality; (2) On glucocorticoid therapy for more than 3 months; (3) Have primary hyperparathyroidism; (4) On osteoporosis medications and need to assess response to drug therapy.  HIV Screening: covered annually if you're between the age of 15-65. Also covered annually if you are younger than 15 and older than 65 with risk factors for HIV infection. For pregnant patients, it is covered up to 3 times per pregnancy.    Immunizations:  Immunization Recommendations   Influenza Vaccine Annual influenza vaccination during flu season is recommended for all persons aged >= 6 months who do not have contraindications   Pneumococcal Vaccine   * Pneumococcal conjugate vaccine = PCV13 (Prevnar 13), PCV15 (Vaxneuvance), PCV20 (Prevnar 20)  *  Pneumococcal polysaccharide vaccine = PPSV23 (Pneumovax) Adults 19-63 yo with certain risk factors or if 65+ yo  If never received any pneumonia vaccine: recommend Prevnar 20 (PCV20)  Give PCV20 if previously received 1 dose of PCV13 or PPSV23   Hepatitis B Vaccine 3 dose series if at intermediate or high risk (ex: diabetes, end stage renal disease, liver disease)   Respiratory syncytial virus (RSV) Vaccine - COVERED BY MEDICARE PART D  * RSVPreF3 (Arexvy) CDC recommends that adults 60 years of age and older may receive a single dose of RSV vaccine using shared clinical decision-making (SCDM)   Tetanus (Td) Vaccine - COST NOT COVERED BY MEDICARE PART B Following completion of primary series, a booster dose should be given every 10 years to maintain immunity against tetanus. Td may also be given as tetanus wound prophylaxis.   Tdap Vaccine - COST NOT COVERED BY MEDICARE PART B Recommended at least once for all adults. For pregnant patients, recommended with each pregnancy.   Shingles Vaccine (Shingrix) - COST NOT COVERED BY MEDICARE PART B  2 shot series recommended in those 19 years and older who have or will have weakened immune systems or those 50 years and older     Health Maintenance Due:      Topic Date Due   • Hepatitis C Screening  07/24/2027 (Originally 1945)     Immunizations Due:      Topic Date Due   • Influenza Vaccine (Season Ended) 09/01/2025     Advance Directives   What are advance directives?  Advance directives are legal documents that state your wishes and plans for medical care. These plans are made ahead of time in case you lose your ability to make decisions for yourself. Advance directives can apply to any medical decision, such as the treatments you want, and if you want to donate organs.   What are the types of advance directives?  There are many types of advance directives, and each state has rules about how to use them. You may choose a combination of any of the following:  Living  will:  This is a written record of the treatment you want. You can also choose which treatments you do not want, which to limit, and which to stop at a certain time. This includes surgery, medicine, IV fluid, and tube feedings.   Durable power of  for healthcare (DPAHC):  This is a written record that states who you want to make healthcare choices for you when you are unable to make them for yourself. This person, called a proxy, is usually a family member or a friend. You may choose more than 1 proxy.  Do not resuscitate (DNR) order:  A DNR order is used in case your heart stops beating or you stop breathing. It is a request not to have certain forms of treatment, such as CPR. A DNR order may be included in other types of advance directives.  Medical directive:  This covers the care that you want if you are in a coma, near death, or unable to make decisions for yourself. You can list the treatments you want for each condition. Treatment may include pain medicine, surgery, blood transfusions, dialysis, IV or tube feedings, and a ventilator (breathing machine).  Values history:  This document has questions about your views, beliefs, and how you feel and think about life. This information can help others choose the care that you would choose.  Why are advance directives important?  An advance directive helps you control your care. Although spoken wishes may be used, it is better to have your wishes written down. Spoken wishes can be misunderstood, or not followed. Treatments may be given even if you do not want them. An advance directive may make it easier for your family to make difficult choices about your care.   Fall Prevention    Fall prevention  includes ways to make your home and other areas safer. It also includes ways you can move more carefully to prevent a fall. Health conditions that cause changes in your blood pressure, vision, or muscle strength and coordination may increase your risk for falls.  Medicines may also increase your risk for falls if they make you dizzy, weak, or sleepy.   Fall prevention tips:   Stand or sit up slowly.    Use assistive devices as directed.    Wear shoes that fit well and have soles that .    Wear a personal alarm.    Stay active.    Manage your medical conditions.    Home Safety Tips:  Add items to prevent falls in the bathroom.    Keep paths clear.    Install bright lights in your home.    Keep items you use often on shelves within reach.    Paint or place reflective tape on the edges of your stairs.    Weight Management   Why it is important to manage your weight:  Being overweight increases your risk of health conditions such as heart disease, high blood pressure, type 2 diabetes, and certain types of cancer. It can also increase your risk for osteoarthritis, sleep apnea, and other respiratory problems. Aim for a slow, steady weight loss. Even a small amount of weight loss can lower your risk of health problems.  How to lose weight safely:  A safe and healthy way to lose weight is to eat fewer calories and get regular exercise. You can lose up about 1 pound a week by decreasing the number of calories you eat by 500 calories each day.   Healthy meal plan for weight management:  A healthy meal plan includes a variety of foods, contains fewer calories, and helps you stay healthy. A healthy meal plan includes the following:  Eat whole-grain foods more often.  A healthy meal plan should contain fiber. Fiber is the part of grains, fruits, and vegetables that is not broken down by your body. Whole-grain foods are healthy and provide extra fiber in your diet. Some examples of whole-grain foods are whole-wheat breads and pastas, oatmeal, brown rice, and bulgur.  Eat a variety of vegetables every day.  Include dark, leafy greens such as spinach, kale, navarro greens, and mustard greens. Eat yellow and orange vegetables such as carrots, sweet potatoes, and winter squash.   Eat a  variety of fruits every day.  Choose fresh or canned fruit (canned in its own juice or light syrup) instead of juice. Fruit juice has very little or no fiber.  Eat low-fat dairy foods.  Drink fat-free (skim) milk or 1% milk. Eat fat-free yogurt and low-fat cottage cheese. Try low-fat cheeses such as mozzarella and other reduced-fat cheeses.  Choose meat and other protein foods that are low in fat.  Choose beans or other legumes such as split peas or lentils. Choose fish, skinless poultry (chicken or turkey), or lean cuts of red meat (beef or pork). Before you cook meat or poultry, cut off any visible fat.   Use less fat and oil.  Try baking foods instead of frying them. Add less fat, such as margarine, sour cream, regular salad dressing and mayonnaise to foods. Eat fewer high-fat foods. Some examples of high-fat foods include french fries, doughnuts, ice cream, and cakes.  Eat fewer sweets.  Limit foods and drinks that are high in sugar. This includes candy, cookies, regular soda, and sweetened drinks.  Exercise:  Exercise at least 30 minutes per day on most days of the week. Some examples of exercise include walking, biking, dancing, and swimming. You can also fit in more physical activity by taking the stairs instead of the elevator or parking farther away from stores. Ask your healthcare provider about the best exercise plan for you.    © Copyright [employer] 2018 Information is for End User's use only and may not be sold, redistributed or otherwise used for commercial purposes. All illustrations and images included in CareNotes® are the copyrighted property of A.D.A.M., Inc. or [employer]

## 2025-06-27 ENCOUNTER — PATIENT OUTREACH (OUTPATIENT)
Dept: CASE MANAGEMENT | Facility: OTHER | Age: 80
End: 2025-06-27

## 2025-06-27 DIAGNOSIS — Z78.9 NEED FOR FOLLOW-UP BY SOCIAL WORKER: Primary | ICD-10-CM

## 2025-06-27 NOTE — PROGRESS NOTES
Received direct PCP referral.  Patient is in need of home health services and referral was placed to  home health who is not taking new patients at this time.  Inbasket message sent to provider to provide alternative agencies; [facility] and [facility], to send referral to.

## 2025-06-27 NOTE — PROGRESS NOTES
Received message back from provider who is requesting social work referral to address any gaps and barriers to care and navigate available resources.  Referral placed.  Provider will place referral for home health services.  Will close to complex care management.

## 2025-07-01 ENCOUNTER — PATIENT OUTREACH (OUTPATIENT)
Dept: CASE MANAGEMENT | Facility: OTHER | Age: 80
End: 2025-07-01

## 2025-07-01 ENCOUNTER — TELEPHONE (OUTPATIENT)
Age: 80
End: 2025-07-01

## 2025-07-01 NOTE — PROGRESS NOTES
OP CM called to pt in regards to needs.  Pts home number is currently not in service.  Called to cell phone is busy.  Will try calling again.

## 2025-07-01 NOTE — TELEPHONE ENCOUNTER
Evan called trying to help the patient as he seems very confused with the outreach he is receiving from [facility] and our [facility] care/'s trying to est patient for the previously order home health services.   Please reach out to evan directly, to review the status of home health orders as well as how she can help facilitate the process to avoid any further confusion for the patient.   Thank you

## 2025-07-02 ENCOUNTER — TELEPHONE (OUTPATIENT)
Age: 80
End: 2025-07-02

## 2025-07-02 ENCOUNTER — PATIENT OUTREACH (OUTPATIENT)
Dept: CASE MANAGEMENT | Facility: OTHER | Age: 80
End: 2025-07-02

## 2025-07-02 NOTE — PROGRESS NOTES
Covering OP CM called to pt again and explained role.  Pt states that he uses a walker at home.  Pt resides with his dtr and granddtr in a bilevel.  Pt also has a stair lift.  Pt states he is able to bathe and dress himself but does it slowly.  Pt states his dtr does the cooking.  Pt is not interested in home health aides.      Pt has [facility] [employer] and they assist with taking pt appts.  Explained if pt needs ambulette he would need to pay privately.  Explained he could call [facility] to discuss membership [phone].  Pt states that he does not want their number.  Pt is also not interested in [facility] [employer].      Pt states he has home PT coming today.      Pt denies any other needs.  Will close case at this time.

## 2025-07-02 NOTE — TELEPHONE ENCOUNTER
Patient calling to report physical therapist was over to his house today to see him and blood pressure was 120/70, wanted to notify provider

## 2025-07-03 DIAGNOSIS — R26.89 IMPAIRMENT OF BALANCE: Primary | ICD-10-CM

## 2025-07-03 DIAGNOSIS — Z01.30 BLOOD PRESSURE CHECK: ICD-10-CM

## 2025-07-03 DIAGNOSIS — G14 POST-POLIO SYNDROME: ICD-10-CM

## 2025-07-03 DIAGNOSIS — M62.81 GENERALIZED MUSCLE WEAKNESS: ICD-10-CM

## 2025-07-09 ENCOUNTER — TELEPHONE (OUTPATIENT)
Age: 80
End: 2025-07-09

## 2025-07-09 NOTE — TELEPHONE ENCOUNTER
Pt rep from [facility] called to confirm therapy status for pt. I informed her that pt is not receiving home care therapies with [facility] as noted on referral. Rep thinks pt may be confused and asked if orders for home care can be placed again for pt.  Please call Padmini from [facility] to advise, thank you.

## 2025-07-09 NOTE — TELEPHONE ENCOUNTER
Padmini from [facility] RN Case Mgr called.  She said regarding the home care for this member.  A provider came for Home Care to Pt through [facility] not [facility]'s.  She was just letting us know that.

## 2025-08-20 ENCOUNTER — VBI (OUTPATIENT)
Dept: ADMINISTRATIVE | Facility: OTHER | Age: 80
End: 2025-08-20